# Patient Record
Sex: FEMALE | Race: WHITE | NOT HISPANIC OR LATINO | Employment: OTHER | URBAN - NONMETROPOLITAN AREA
[De-identification: names, ages, dates, MRNs, and addresses within clinical notes are randomized per-mention and may not be internally consistent; named-entity substitution may affect disease eponyms.]

---

## 2017-01-21 ENCOUNTER — OFFICE VISIT (OUTPATIENT)
Dept: URGENT CARE | Facility: PHYSICIAN GROUP | Age: 82
End: 2017-01-21
Payer: MEDICARE

## 2017-01-21 VITALS
OXYGEN SATURATION: 94 % | HEIGHT: 61 IN | BODY MASS INDEX: 30.78 KG/M2 | WEIGHT: 163 LBS | SYSTOLIC BLOOD PRESSURE: 114 MMHG | HEART RATE: 88 BPM | TEMPERATURE: 98.2 F | DIASTOLIC BLOOD PRESSURE: 82 MMHG | RESPIRATION RATE: 14 BRPM

## 2017-01-21 DIAGNOSIS — L60.0 INGROWN NAIL OF GREAT TOE OF LEFT FOOT: ICD-10-CM

## 2017-01-21 PROCEDURE — 99214 OFFICE O/P EST MOD 30 MIN: CPT | Performed by: PHYSICIAN ASSISTANT

## 2017-01-21 NOTE — PROGRESS NOTES
Chief Complaint   Patient presents with   • Ingrown Toenail     L/ foot       HISTORY OF PRESENT ILLNESS: Patient is a 81 y.o. female who presents today with her  for the following:    Ingrown left great toe nail x weeks  C/o pain but denies STS  Reports h/o the same  Denies drainage  Has not taken any OTC meds or soaked her foot    Patient Active Problem List    Diagnosis Date Noted   • Fracture of clavicle, closed 01/15/2010       Allergies:Cefaclor; Compazine; Morphine; Penicillins; and Sulfa drugs    Current Outpatient Prescriptions Ordered in [facility]   Medication Sig Dispense Refill   • oxycodone immediate release (ROXICODONE) 10 MG immediate release tablet Take 10 mg by mouth every four hours as needed for Moderate Pain.     • doxycycline (VIBRAMYCIN) 100 MG Tab Take 1 Tab by mouth 2 times a day. 20 Tab 0   • guaifenesin-codeine (CHERATUSSIN AC) Solution oral solution Take 5 mL by mouth every four hours as needed for Cough. 120 mL 0   • levothyroxine (SYNTHROID) 125 MCG Tab Take 125 mcg by mouth Every morning on an empty stomach.     • albuterol (PROVENTIL) 2.5mg/3ml Nebu Soln solution for nebulization 3 mL by Nebulization route every four hours as needed for Shortness of Breath. 150 mL 0   • LOSARTAN POTASSIUM PO Take  by mouth.     • furosemide (LASIX) 20 MG TABS Take 20 mg by mouth 2 times a day.     • Mag Aspart-Potassium Aspart (POTASSIUM & MAGNESIUM ASPARTAT PO) Take 200 mg by mouth.       No current Epic-ordered facility-administered medications on file.       Past Medical History   Diagnosis Date   • Anaphylactic shock    • Pneumonia        Social History   Substance Use Topics   • Smoking status: Never Smoker    • Smokeless tobacco: Not on file   • Alcohol Use: No       No family status information on file.   No family history on file.    ROS:   Review of Systems   Constitutional: Negative for fever, chills, weight loss and malaise/fatigue.   HENT: Negative for ear pain, nosebleeds, congestion,  "sore throat and neck pain.    Eyes: Negative for blurred vision.   Respiratory: Negative for cough, sputum production, shortness of breath and wheezing.    Cardiovascular: Negative for chest pain, palpitations, orthopnea and leg swelling.   Gastrointestinal: Negative for heartburn, nausea, vomiting and abdominal pain.   Genitourinary: Negative for dysuria, urgency and frequency.       Exam:  Blood pressure 114/82, pulse 88, temperature 36.8 °C (98.2 °F), resp. rate 14, height 1.549 m (5' 1\"), weight 73.936 kg (163 lb), SpO2 94 %.  General: Normal appearing. No distress.  HEENT: Head is grossly normal.  Pulmonary: No respiratory distress noted.  Cardiovascular: Left pedal pulse is strong.  Neurologic: Grossly nonfocal. No sensory deficit noted.  Extremities: Left great toe nail is ingrowing on the medial aspect only. No STS, erythema, or drainage noted at the site. TTP.  Skin: No obvious lesions.  Psych: Normal mood. Alert and oriented x3. Judgment and insight is normal.    Assessment/Plan:  Discussed soaking in warm epsom salt water. Will refer to podiatry for further evaluation and management.    1. Ingrown nail of great toe of left foot  REFERRAL TO PODIATRY         "

## 2017-03-16 ENCOUNTER — HOSPITAL ENCOUNTER (OUTPATIENT)
Dept: LAB | Facility: MEDICAL CENTER | Age: 82
End: 2017-03-16
Attending: NURSE PRACTITIONER
Payer: MEDICARE

## 2017-03-16 ENCOUNTER — OFFICE VISIT (OUTPATIENT)
Dept: URGENT CARE | Facility: PHYSICIAN GROUP | Age: 82
End: 2017-03-16
Payer: MEDICARE

## 2017-03-16 ENCOUNTER — HOSPITAL ENCOUNTER (OUTPATIENT)
Dept: RADIOLOGY | Facility: MEDICAL CENTER | Age: 82
End: 2017-03-16
Attending: PHYSICIAN ASSISTANT
Payer: MEDICARE

## 2017-03-16 VITALS
DIASTOLIC BLOOD PRESSURE: 76 MMHG | HEIGHT: 61 IN | BODY MASS INDEX: 30.78 KG/M2 | WEIGHT: 163 LBS | HEART RATE: 67 BPM | SYSTOLIC BLOOD PRESSURE: 122 MMHG | RESPIRATION RATE: 20 BRPM | TEMPERATURE: 98.6 F | OXYGEN SATURATION: 97 %

## 2017-03-16 DIAGNOSIS — J22 LOWER RESPIRATORY INFECTION (E.G., BRONCHITIS, PNEUMONIA, PNEUMONITIS, PULMONITIS): ICD-10-CM

## 2017-03-16 DIAGNOSIS — R05.9 COUGH: ICD-10-CM

## 2017-03-16 DIAGNOSIS — J22 LOWER RESPIRATORY INFECTION (E.G., BRONCHITIS, PNEUMONIA, PNEUMONITIS, PULMONITIS): Primary | ICD-10-CM

## 2017-03-16 LAB
ALBUMIN SERPL BCP-MCNC: 3.8 G/DL (ref 3.2–4.9)
ALBUMIN/GLOB SERPL: 1.1 G/DL
ALP SERPL-CCNC: 93 U/L (ref 30–99)
ALT SERPL-CCNC: <5 U/L (ref 2–50)
ANION GAP SERPL CALC-SCNC: 7 MMOL/L (ref 0–11.9)
AST SERPL-CCNC: 15 U/L (ref 12–45)
BILIRUB SERPL-MCNC: 0.3 MG/DL (ref 0.1–1.5)
BUN SERPL-MCNC: 15 MG/DL (ref 8–22)
CALCIUM SERPL-MCNC: 9 MG/DL (ref 8.5–10.5)
CHLORIDE SERPL-SCNC: 96 MMOL/L (ref 96–112)
CO2 SERPL-SCNC: 27 MMOL/L (ref 20–33)
CREAT SERPL-MCNC: 0.9 MG/DL (ref 0.5–1.4)
GLOBULIN SER CALC-MCNC: 3.4 G/DL (ref 1.9–3.5)
GLUCOSE SERPL-MCNC: 86 MG/DL (ref 65–99)
POTASSIUM SERPL-SCNC: 4.2 MMOL/L (ref 3.6–5.5)
PROT SERPL-MCNC: 7.2 G/DL (ref 6–8.2)
SODIUM SERPL-SCNC: 130 MMOL/L (ref 135–145)

## 2017-03-16 PROCEDURE — 4040F PNEUMOC VAC/ADMIN/RCVD: CPT | Mod: 8P | Performed by: PHYSICIAN ASSISTANT

## 2017-03-16 PROCEDURE — G8419 CALC BMI OUT NRM PARAM NOF/U: HCPCS | Performed by: PHYSICIAN ASSISTANT

## 2017-03-16 PROCEDURE — 1036F TOBACCO NON-USER: CPT | Performed by: PHYSICIAN ASSISTANT

## 2017-03-16 PROCEDURE — G8432 DEP SCR NOT DOC, RNG: HCPCS | Performed by: PHYSICIAN ASSISTANT

## 2017-03-16 PROCEDURE — 99214 OFFICE O/P EST MOD 30 MIN: CPT | Performed by: PHYSICIAN ASSISTANT

## 2017-03-16 PROCEDURE — G8484 FLU IMMUNIZE NO ADMIN: HCPCS | Performed by: PHYSICIAN ASSISTANT

## 2017-03-16 PROCEDURE — 71020 DX-CHEST-2 VIEWS: CPT

## 2017-03-16 PROCEDURE — 1101F PT FALLS ASSESS-DOCD LE1/YR: CPT | Mod: 8P | Performed by: PHYSICIAN ASSISTANT

## 2017-03-16 RX ORDER — AZITHROMYCIN 250 MG/1
TABLET, FILM COATED ORAL
Qty: 6 TAB | Refills: 0 | Status: SHIPPED | OUTPATIENT
Start: 2017-03-16 | End: 2017-03-27

## 2017-03-16 RX ORDER — METOPROLOL SUCCINATE 50 MG/1
25 TABLET, EXTENDED RELEASE ORAL 2 TIMES DAILY
COMMUNITY

## 2017-03-16 ASSESSMENT — ENCOUNTER SYMPTOMS
SHORTNESS OF BREATH: 1
GASTROINTESTINAL NEGATIVE: 1
COUGH: 1
CARDIOVASCULAR NEGATIVE: 1
SPUTUM PRODUCTION: 1
PSYCHIATRIC NEGATIVE: 1
MUSCULOSKELETAL NEGATIVE: 1
CHILLS: 1
EYES NEGATIVE: 1
DIZZINESS: 1
FEVER: 1

## 2017-03-16 NOTE — PROGRESS NOTES
Subjective:      Samanta Lozano is a 81 y.o. female who presents with Cough            Cough  Associated symptoms include chills, a fever and shortness of breath.     Chief Complaint   Patient presents with   • Cough     productive x6days       HPI:  Samanta Lozano is a 81 y.o. female who presents with productive cough x 6 days.  Had double PNE in November and was hospitalized.  Got home in January she finally got home.  Cough and fatigue.  Green to yellow mucous.  No runny nose or sore throat.  Low grade fever at home.      Hasn't tried any otc medication.  Patient denies HA, SOB, chest pain, palpitations, fever, chills, or n/v/d.      Past Medical History   Diagnosis Date   • Anaphylactic shock    • Pneumonia        Past Surgical History   Procedure Laterality Date   • Other orthopedic surgery       bilat knee, C5-6 fusion, multiple ortho surgeries   • Gyn surgery         No family history on file.    Social History     Social History   • Marital Status:      Spouse Name: N/A   • Number of Children: N/A   • Years of Education: N/A     Occupational History   • Not on file.     Social History Main Topics   • Smoking status: Never Smoker    • Smokeless tobacco: Not on file   • Alcohol Use: No   • Drug Use: No   • Sexual Activity: Not on file     Other Topics Concern   • Not on file     Social History Narrative         Current outpatient prescriptions:   •  metoprolol SR, 50 mg, Oral, DAILY  •  oxycodone immediate release, 10 mg, Oral, Q4HRS PRN, 3/16/2017  •  levothyroxine, 125 mcg, Oral, AM ES, 3/16/2017  •  LOSARTAN POTASSIUM PO, Take  by mouth., 3/16/2017  •  furosemide, 20 mg, Oral, BID, 3/16/2017  •  Mag Aspart-Potassium Aspart (POTASSIUM & MAGNESIUM ASPARTAT PO), Take 200 mg by mouth., 3/16/2017  •  doxycycline, 100 mg, Oral, BID  •  guaifenesin-codeine, 5 mL, Oral, Q4HRS PRN  •  albuterol, 2.5 mg, Nebulization, Q4HRS PRN, prn    Allergies   Allergen Reactions   • Cefaclor    • Compazine    •  "Morphine    • Penicillins    • Sulfa Drugs             Review of Systems   Constitutional: Positive for fever, chills and malaise/fatigue.   HENT: Negative.    Eyes: Negative.    Respiratory: Positive for cough, sputum production and shortness of breath.    Cardiovascular: Negative.    Gastrointestinal: Negative.    Genitourinary: Negative.    Musculoskeletal: Negative.    Skin: Negative.    Neurological: Positive for dizziness.   Endo/Heme/Allergies: Negative.    Psychiatric/Behavioral: Negative.           Objective:     /76 mmHg  Pulse 67  Temp(Src) 37 °C (98.6 °F)  Resp 20  Ht 1.549 m (5' 0.98\")  Wt 73.936 kg (163 lb)  BMI 30.81 kg/m2  SpO2 97%     Physical Exam       Nursing note and vitals reviewed.    Constitutional:   Appropriately groomed, pleasant affect, well nourished, in NAD.    Head:   Normocephalic, atraumatic.    Eyes:   PERRLA, EOM's full, sclera white, conjunctiva not erythematous, and medial canthus without exudate bilaterally.    Ears:  Auricle and tragus non-tender to manipulation.  No pre-auricular lymphadenopathy or mastoid ttp.  EACs with mild cerumen bilaterally, not erythematous.  TM’s pearly gray with cone of light present and umbo and malleolus visible bilaterally.  No bulging or fluid bubbles present in middle ear.  Hearing grossly intact to voice.    Nose:  Nares not patent bilaterally.  Nasal mucosa edematous with white rhinorrhea bilaterally.  Mild sinus tenderness to percussion.    Throat:  Dentition wnl, mucosa moist without lesions.  Oropharynx mildly erythematous, with no enlargement of the palatine tonsils bilaterally with no exudates.    Post nasal drainage  present.  Soft palate rises symmetrically bilaterally and uvula midline.      Neck: Neck supple, with mild anterior lymphadenopathy that is soft and mobile to palpation. Thyroid non-palpable without tenderness or nodules. No supraclavicular lymphadenopathy.    Lungs:  Respiratory effort not labored without " accessory muscle use.  Lungs with inspiratory wheezes to auscultation and bibasilar rales. Rhonchi cleared with cough.    Heart:  RRR, without murmurs rubs or gallops.  Radial and dorsalis pedis pulse 2+ bilaterally.  No LE edema.    Musculoskeletal:  Gait non-antalgic with a narrow base.    Derm:  Skin without rashes or lesions with good turgor pressure.      Psychiatric:  Mood, affect, and judgement appropriate.  3/16/2017 3:13 PM    HISTORY/REASON FOR EXAM:  Cough and history of pneumonia one month ago.      TECHNIQUE/EXAM DESCRIPTION AND NUMBER OF VIEWS:  Two views of the chest.    COMPARISON:  06/02/2016    FINDINGS:  The heart is normal in size.  No pulmonary infiltrates or consolidations are noted.  No pleural effusions are appreciated.  Minimal interstitial opacifications are again noted mainly in the left lung base.         Impression        No active disease.         Reading Provider Reading Date     Abbe Archer M.D. Mar 16, 2017          Assessment/Plan:     1. Lower respiratory infection (e.g., bronchitis, pneumonia, pneumonitis, pulmonitis)  Patient presents with 6 days of productive cough with yellow productive phlegm. Available for review during appointment. Patient has been having increased fatigue and symptoms similar to previous ammonia episode in which she was hospitalized for some time. On exam patient is afebrile with oxygen saturation 97% room air. Not acute appearing. No significant coryza or postnasal drip present. Lungs with his inspiratory wheezes to auscultation and bibasilar rails. X-ray reviewed by me shows evidence of basilar interstitial opacification with no active consolidation or evidence of pneumonia. Given patient's symptomology and age plan of treatment with azithromycin and advised following up with urgent care in 3-4 days or with primary care for improving.    Patient was in agreement with this treatment plan and seemed to understand without barriers. All questions were  encouraged and answered.  Reviewed signs and symptoms of when to seek emergency medical care.     Please note that this dictation was created using voice recognition software.  I have made every reasonable attempt to correct obvious errors, but I expect there are errors of petra and possibly content that I did not discover before finalizing the note.     - DX-CHEST-2 VIEWS; Future  - azithromycin (ZITHROMAX) 250 MG Tab; 2 tablets on day one, then 1 tablet PO daily until done.  Dispense: 6 Tab; Refill: 0    2. Cough    - DX-CHEST-2 VIEWS; Future  - azithromycin (ZITHROMAX) 250 MG Tab; 2 tablets on day one, then 1 tablet PO daily until done.  Dispense: 6 Tab; Refill: 0

## 2017-03-16 NOTE — PATIENT INSTRUCTIONS
Get the X-ray done.  Do breathing tx every 4-6 hours for lung tightness and shortness of breath.  Start antibiotic.  Stay well hydrated.  Return next week to see PCP.    Return Saturday or Sunday for recheck at urgent care.

## 2017-03-17 ENCOUNTER — TELEPHONE (OUTPATIENT)
Dept: URGENT CARE | Facility: PHYSICIAN GROUP | Age: 82
End: 2017-03-17

## 2017-03-17 NOTE — TELEPHONE ENCOUNTER
Spoke to patient regarding negative x-ray.  Taking the antibiotic.  Advised f/u with UC next week or PCP.  All questions encouraged and answered.

## 2017-03-21 ENCOUNTER — OFFICE VISIT (OUTPATIENT)
Dept: URGENT CARE | Facility: PHYSICIAN GROUP | Age: 82
End: 2017-03-21
Payer: MEDICARE

## 2017-03-21 VITALS
RESPIRATION RATE: 20 BRPM | HEART RATE: 87 BPM | BODY MASS INDEX: 30.25 KG/M2 | TEMPERATURE: 98.4 F | OXYGEN SATURATION: 94 % | DIASTOLIC BLOOD PRESSURE: 74 MMHG | SYSTOLIC BLOOD PRESSURE: 132 MMHG | WEIGHT: 160 LBS

## 2017-03-21 DIAGNOSIS — J22 ACUTE LOWER RESPIRATORY INFECTION: ICD-10-CM

## 2017-03-21 PROCEDURE — G8484 FLU IMMUNIZE NO ADMIN: HCPCS | Performed by: NURSE PRACTITIONER

## 2017-03-21 PROCEDURE — 1101F PT FALLS ASSESS-DOCD LE1/YR: CPT | Mod: 8P | Performed by: NURSE PRACTITIONER

## 2017-03-21 PROCEDURE — G8419 CALC BMI OUT NRM PARAM NOF/U: HCPCS | Performed by: NURSE PRACTITIONER

## 2017-03-21 PROCEDURE — G8432 DEP SCR NOT DOC, RNG: HCPCS | Performed by: NURSE PRACTITIONER

## 2017-03-21 PROCEDURE — 99214 OFFICE O/P EST MOD 30 MIN: CPT | Performed by: NURSE PRACTITIONER

## 2017-03-21 PROCEDURE — 1036F TOBACCO NON-USER: CPT | Performed by: NURSE PRACTITIONER

## 2017-03-21 PROCEDURE — 4040F PNEUMOC VAC/ADMIN/RCVD: CPT | Mod: 8P | Performed by: NURSE PRACTITIONER

## 2017-03-21 RX ORDER — DOXYCYCLINE HYCLATE 100 MG/1
100 CAPSULE ORAL 2 TIMES DAILY
Qty: 20 CAP | Refills: 0 | Status: SHIPPED | OUTPATIENT
Start: 2017-03-21 | End: 2017-04-10

## 2017-03-21 ASSESSMENT — ENCOUNTER SYMPTOMS
SPUTUM PRODUCTION: 1
COUGH: 1
FEVER: 0
SORE THROAT: 0
WEAKNESS: 1
DIARRHEA: 0
SHORTNESS OF BREATH: 1
RHINORRHEA: 0
CHILLS: 0
VOMITING: 0
NAUSEA: 0
MYALGIAS: 0

## 2017-03-21 NOTE — PROGRESS NOTES
Subjective:      Samanta Lozano is a 81 y.o. female who presents with Pneumonia            HPI Comments: Medications, Allergies and Prior Medical Hx reviewed and updated in [facility].with patient/family today     Patient has a history of hospitalization due to pneumonia and prolonged rehabilitation. She developed a lower respiratory symptoms again was seen here, placed on a Z-Gamaliel. Patient states she is still coughing up mucus. Patient has been unable get into her primary care provider for follow-up. Since her hospitalization. She is complaining of being fatigued, weak.    Cough  This is a new problem. The current episode started in the past 7 days. The problem has been gradually improving. The cough is productive of sputum. Associated symptoms include shortness of breath. Pertinent negatives include no chills, ear pain, fever, myalgias, nasal congestion, rhinorrhea or sore throat. She has tried a beta-agonist inhaler (z pack) for the symptoms. The treatment provided mild relief. Her past medical history is significant for pneumonia.       Review of Systems   Constitutional: Positive for malaise/fatigue. Negative for fever and chills.   HENT: Negative for congestion, ear discharge, ear pain, rhinorrhea and sore throat.    Respiratory: Positive for cough, sputum production and shortness of breath.    Gastrointestinal: Negative for nausea, vomiting and diarrhea.   Musculoskeletal: Negative for myalgias.   Neurological: Positive for weakness.          Objective:     /74 mmHg  Pulse 87  Temp(Src) 36.9 °C (98.4 °F)  Resp 20  Wt 72.576 kg (160 lb)  SpO2 94%     Physical Exam   Constitutional: She appears well-developed and well-nourished. No distress.   HENT:   Head: Normocephalic and atraumatic.   Right Ear: Tympanic membrane and ear canal normal.   Left Ear: Tympanic membrane and ear canal normal.   Nose: No rhinorrhea.   Mouth/Throat: Uvula is midline and mucous membranes are normal. Posterior oropharyngeal  erythema present. No oropharyngeal exudate or posterior oropharyngeal edema.   Eyes: Conjunctivae are normal. Pupils are equal, round, and reactive to light.   Neck: Neck supple.   Cardiovascular: Normal rate, regular rhythm and normal heart sounds.    Pulmonary/Chest: Effort normal and breath sounds normal. No respiratory distress. She has no wheezes. She has no rales.   Lymphadenopathy:     She has cervical adenopathy.   Neurological: She is alert.   Skin: Skin is warm and dry.   Psychiatric: She has a normal mood and affect. Her behavior is normal.   Vitals reviewed.              Assessment/Plan:       1. Acute lower respiratory infection  doxycycline (VIBRAMYCIN) 100 MG Cap       Patient states she dropped and lost one of her azithromycin, so she was only able to complete 4 days of her treatment.    Appointment was set up for the patient with her primary care physician for next week.    Pt will go to the ER for worsening or changing symptoms as discussed,   Follow-up with your primary care provider as scheduled  Discharge instructions discussed with pt/family who verbalize understanding and agreement with poc

## 2017-03-21 NOTE — MR AVS SNAPSHOT
Samanta Lozano   3/21/2017 3:25 PM   Office Visit   MRN: [id number]    Department:  [facility]   Dept Phone:  [phone]    Description:  Female : 1935   Provider:  CHINO Mcnamara           Reason for Visit     Pneumonia           Allergies as of 3/21/2017     Allergen Noted Reactions    Cefaclor 2008       Compazine 2008       Morphine 2008       Penicillins 2008       Sulfa Drugs 2008         You were diagnosed with     Acute lower respiratory infection   [[id number]]         Vital Signs     Blood Pressure Pulse Temperature Respirations Weight Oxygen Saturation    132/74 mmHg 87 36.9 °C (98.4 °F) 20 72.576 kg (160 lb) 94%    Smoking Status                   Never Smoker            Basic Information     Date Of Birth Sex Race Ethnicity Preferred Language    1935 Female White Non- English      Your appointments     Mar 27, 2017  9:20 AM   Established Patient with Deepa Powell M.D.   [facility] ([address] [address] [phone]           You will be receiving a confirmation call a few days before your appointment from our automated call confirmation system.              Problem List              ICD-10-CM Priority Class Noted - Resolved    Fracture of clavicle, closed S42.009A   1/15/2010 - Present      Health Maintenance        Date Due Completion Dates    PAP SMEAR 10/19/1956 ---    MAMMOGRAM 10/19/1975 ---    COLONOSCOPY 10/19/1985 ---    IMM ZOSTER VACCINE 10/19/1995 ---    BONE DENSITY 10/19/2000 ---    IMM PNEUMOCOCCAL 65+ (ADULT) LOW/MEDIUM RISK SERIES (1 of 2 - PCV13) 10/19/2000 ---    IMM INFLUENZA (1) 2016 ---    IMM DTaP/Tdap/Td Vaccine (2 - Td) 2020            Current Immunizations     Tdap Vaccine 2010  4:47 PM      Below and/or attached are the medications your provider expects you to take. Review all of your home medications and newly ordered  medications with your provider and/or pharmacist. Follow medication instructions as directed by your provider and/or pharmacist. Please keep your medication list with you and share with your provider. Update the information when medications are discontinued, doses are changed, or new medications (including over-the-counter products) are added; and carry medication information at all times in the event of emergency situations     Allergies:  CEFACLOR - (reactions not documented)     COMPAZINE - (reactions not documented)     MORPHINE - (reactions not documented)     PENICILLINS - (reactions not documented)     SULFA DRUGS - (reactions not documented)               Medications  Valid as of: March 21, 2017 -  4:22 PM    Generic Name Brand Name Tablet Size Instructions for use    Albuterol Sulfate (Nebu Soln) PROVENTIL 2.5mg/3ml 3 mL by Nebulization route every four hours as needed for Shortness of Breath.        Azithromycin (Tab) ZITHROMAX 250 MG 2 tablets on day one, then 1 tablet PO daily until done.        Doxycycline Hyclate (Tab) VIBRAMYCIN 100 MG Take 1 Tab by mouth 2 times a day.        Doxycycline Hyclate (Cap) VIBRAMYCIN 100 MG Take 1 Cap by mouth 2 times a day.        Furosemide (Tab) LASIX 20 MG Take 20 mg by mouth 2 times a day.        Guaifenesin-Codeine (Solution) ROBITUSSIN -10 mg/5mL Take 5 mL by mouth every four hours as needed for Cough.        Levothyroxine Sodium (Tab) SYNTHROID 125 MCG Take 125 mcg by mouth Every morning on an empty stomach.        Losartan Potassium   Take  by mouth.        Mag Aspart-Potassium Aspart   Take 200 mg by mouth.        Metoprolol Succinate (TABLET SR 24 HR) TOPROL XL 50 MG Take 50 mg by mouth every day.        OxyCODONE HCl (Tab) ROXICODONE 10 MG Take 10 mg by mouth every four hours as needed for Moderate Pain.        .                 Medicines prescribed today were sent to:     [id number] - [place], [place] - [address] [facility]  [address]    [address] [place] [address]    Phone: [phone] Fax: [phone]    Open 24 Hours?: No      Medication refill instructions:       If your prescription bottle indicates you have medication refills left, it is not necessary to call your provider’s office. Please contact your pharmacy and they will refill your medication.    If your prescription bottle indicates you do not have any refills left, you may request refills at any time through one of the following ways: The online [employer] system (except Urgent Care), by calling your provider’s office, or by asking your pharmacy to contact your provider’s office with a refill request. Medication refills are processed only during regular business hours and may not be available until the next business day. Your provider may request additional information or to have a follow-up visit with you prior to refilling your medication.   *Please Note: Medication refills are assigned a new Rx number when refilled electronically. Your pharmacy may indicate that no refills were authorized even though a new prescription for the same medication is available at the pharmacy. Please request the medicine by name with the pharmacy before contacting your provider for a refill.           [employer] Access Code: [email or web address]  Expires: 4/9/2017  2:25 PM    Your email address is not on file at [facility].  Email Addresses are required for you to sign up for [employer], please contact [phone] to verify your personal information and to provide your email address prior to attempting to register for [employer].    Samanta Lozano  [address] [place] [address]    [employer]  A secure, online tool to manage your health information     [facility]’s [employer] Software® is a secure, online tool that connects you to your personalized health information from the privacy of your home -- day or night - making it very easy for you to manage your healthcare. Once the activation  process is completed, you can even access your medical information using the [employer]ex dona, which is available for free in the Apple Dona store or Google Play store.     To learn more about [employer], visit [email or web address]    There are two levels of access available (as shown below):   My Chart Features  [facility] Doctor [facility]  Specialists [facility]  Urgent  Care Non-[facility] Primary Care Doctor   Email your healthcare team securely and privately 24/7 X X X    Manage appointments: schedule your next appointment; view details of past/upcoming appointments X      Request prescription refills. X      View recent personal medical records, including lab and immunizations X X X X   View health record, including health history, allergies, medications X X X X   Read reports about your outpatient visits, procedures, consult and ER notes X X X X   See your discharge summary, which is a recap of your hospital and/or ER visit that includes your diagnosis, lab results, and care plan X X  X     How to register for X[employer]ex:  Once your e-mail address has been verified, follow the following steps to sign up for [employer].     1. Go to  [email or web address]  2. Click on the Sign Up Now box, which takes you to the New Member Sign Up page. You will need to provide the following information:  a. Enter your [employer] Access Code exactly as it appears at the top of this page. (You will not need to use this code after you’ve completed the sign-up process. If you do not sign up before the expiration date, you must request a new code.)   b. Enter your date of birth.   c. Enter your home email address.   d. Click Submit, and follow the next screen’s instructions.  3. Create a TeamVisibilityt ID. This will be your Xcedex login ID and cannot be changed, so think of one that is secure and easy to remember.  4. Create a [employer] password. You can change your password at any time.  5. Enter your Password Reset Question and Answer. This can be  used at a later time if you forget your password.   6. Enter your e-mail address. This allows you to receive e-mail notifications when new information is available in [employer].  7. Click Sign Up. You can now view your health information.    For assistance activating your [employer] account, call [phone]

## 2017-03-24 ENCOUNTER — TELEPHONE (OUTPATIENT)
Dept: MEDICAL GROUP | Facility: PHYSICIAN GROUP | Age: 82
End: 2017-03-24

## 2017-03-27 ENCOUNTER — OFFICE VISIT (OUTPATIENT)
Dept: MEDICAL GROUP | Facility: PHYSICIAN GROUP | Age: 82
End: 2017-03-27
Payer: MEDICARE

## 2017-03-27 ENCOUNTER — APPOINTMENT (OUTPATIENT)
Dept: RADIOLOGY | Facility: IMAGING CENTER | Age: 82
End: 2017-03-27
Attending: FAMILY MEDICINE
Payer: MEDICARE

## 2017-03-27 DIAGNOSIS — J18.9 PNEUMONIA DUE TO INFECTIOUS ORGANISM, UNSPECIFIED LATERALITY, UNSPECIFIED PART OF LUNG: ICD-10-CM

## 2017-03-27 DIAGNOSIS — Z09 HOSPITAL DISCHARGE FOLLOW-UP: Primary | ICD-10-CM

## 2017-03-27 DIAGNOSIS — E03.9 HYPOTHYROIDISM, UNSPECIFIED TYPE: ICD-10-CM

## 2017-03-27 PROCEDURE — 1036F TOBACCO NON-USER: CPT | Performed by: FAMILY MEDICINE

## 2017-03-27 PROCEDURE — G8419 CALC BMI OUT NRM PARAM NOF/U: HCPCS | Performed by: FAMILY MEDICINE

## 2017-03-27 PROCEDURE — G8484 FLU IMMUNIZE NO ADMIN: HCPCS | Performed by: FAMILY MEDICINE

## 2017-03-27 PROCEDURE — 4040F PNEUMOC VAC/ADMIN/RCVD: CPT | Mod: 8P | Performed by: FAMILY MEDICINE

## 2017-03-27 PROCEDURE — 1100F PTFALLS ASSESS-DOCD GE2>/YR: CPT | Performed by: FAMILY MEDICINE

## 2017-03-27 PROCEDURE — 3288F FALL RISK ASSESSMENT DOCD: CPT | Mod: 8P | Performed by: FAMILY MEDICINE

## 2017-03-27 PROCEDURE — G8432 DEP SCR NOT DOC, RNG: HCPCS | Performed by: FAMILY MEDICINE

## 2017-03-27 PROCEDURE — 99215 OFFICE O/P EST HI 40 MIN: CPT | Performed by: FAMILY MEDICINE

## 2017-03-27 PROCEDURE — 0518F FALL PLAN OF CARE DOCD: CPT | Mod: 8P | Performed by: FAMILY MEDICINE

## 2017-03-27 PROCEDURE — 71020 DX-CHEST-2 VIEWS: CPT | Mod: 26 | Performed by: FAMILY MEDICINE

## 2017-03-28 ENCOUNTER — HOSPITAL ENCOUNTER (OUTPATIENT)
Dept: LAB | Facility: MEDICAL CENTER | Age: 82
End: 2017-03-28
Attending: FAMILY MEDICINE
Payer: MEDICARE

## 2017-03-28 DIAGNOSIS — E03.9 HYPOTHYROIDISM, UNSPECIFIED TYPE: ICD-10-CM

## 2017-03-28 LAB — TSH SERPL DL<=0.005 MIU/L-ACNC: 1.74 UIU/ML (ref 0.3–3.7)

## 2017-03-28 PROCEDURE — 36415 COLL VENOUS BLD VENIPUNCTURE: CPT

## 2017-03-28 PROCEDURE — 84443 ASSAY THYROID STIM HORMONE: CPT

## 2017-03-29 ENCOUNTER — TELEPHONE (OUTPATIENT)
Dept: MEDICAL GROUP | Age: 82
End: 2017-03-29

## 2017-03-29 VITALS
OXYGEN SATURATION: 96 % | HEIGHT: 61 IN | HEART RATE: 72 BPM | BODY MASS INDEX: 30.21 KG/M2 | TEMPERATURE: 98.1 F | DIASTOLIC BLOOD PRESSURE: 80 MMHG | WEIGHT: 160 LBS | SYSTOLIC BLOOD PRESSURE: 124 MMHG

## 2017-03-29 NOTE — PROGRESS NOTES
Subjective:   CC: hospital discharge follow up    HPI:     Samanta Lozano is a 81 y.o. female, new patient of the clinic, presents with the following concerns:     This is an 82 yo female, who was admitted to [facility] on 2016 for RUL/RML pneumonia and severe sepsis.   She received IVF and antibiotics. Sputum culture, blood culture, influenza A/B were negative. She was found to have elevated troponin w/o chest pain at time of admission. Lexiscan done on 2016 was negative for e/o ischemia. Her trop was trended and eventually returned to normal range. This was determined to be demand ischemia. Pt also has hx of Afib. She did goes into RVR during hospital admission, which responded well to a combination of metoprolol, digoxin, and Cardizem. She also developed DEBRA and electrolyte abnormalities. Both resolved at the time of discharge. Pt was discharged to SNF on 2016. She remained in rehab for couple weeks and was discharged home after.     Pt presented to urgent care on 3/16/2016 for productive cough and fatigue. CXR negative for active disease. Azithromycin was prescribed, but symptoms fail to improve. Pt again presented to urgent care on 3/21/2017. This time, Doxycyline was prescribed. Today, pt states that she is on the last dose of Doxycycline. Cough is improving, but she still feels very tired, and not quite back to baseline. She is concerned because her brother  from pneumonia couple months ago. She otherwise eats and sleeps well. She has good support from her  and family.     Since discharge, she was seen by her cardiologist.     She has hx of hypothyroidism. She used to take Levothyroxine, but was discontinued by previous PCP for unclear reason(s). She is interested in having her thyroid level rechecked.     Current medicines (including changes today)  Current Outpatient Prescriptions   Medication Sig Dispense Refill   • doxycycline (VIBRAMYCIN) 100 MG Cap Take 1 Cap by  "mouth 2 times a day. 20 Cap 0   • metoprolol SR (TOPROL XL) 50 MG TABLET SR 24 HR Take 25 mg by mouth 2 Times a Day.     • oxycodone immediate release (ROXICODONE) 10 MG immediate release tablet Take 10 mg by mouth every four hours as needed for Moderate Pain.     • doxycycline (VIBRAMYCIN) 100 MG Tab Take 1 Tab by mouth 2 times a day. 20 Tab 0   • guaifenesin-codeine (CHERATUSSIN AC) Solution oral solution Take 5 mL by mouth every four hours as needed for Cough. 120 mL 0   • albuterol (PROVENTIL) 2.5mg/3ml Nebu Soln solution for nebulization 3 mL by Nebulization route every four hours as needed for Shortness of Breath. 150 mL 0   • LOSARTAN POTASSIUM PO Take 25 mg by mouth 2 Times a Day.     • furosemide (LASIX) 20 MG TABS Take 40 mg by mouth every day.     • Mag Aspart-Potassium Aspart (POTASSIUM & MAGNESIUM ASPARTAT PO) Take 200 mg by mouth.     • levothyroxine (SYNTHROID) 125 MCG Tab Take 125 mcg by mouth Every morning on an empty stomach.       No current facility-administered medications for this visit.     She  has a past medical history of Anaphylactic shock and Pneumonia.    I personally reviewed patient's problem list, allergies, medications, family hx, social hx with patient and update EPIC.     REVIEW OF SYSTEMS:  CONSTITUTIONAL:  Denies night sweats, fatigue, malaise, lethargy, fever or chills.  RESPIRATORY:  Denies cough, wheeze, hemoptysis, or shortness of breath.  CARDIOVASCULAR:  Denies chest pains, palpitations, pedal edema  GASTROINTESTINAL:  Denies abdominal pain, nausea or vomiting, diarrhea, constipation, hematemesis, hematochezia, melena.  GENITOURINARY:  Denies urinary urgency, frequency, dysuria, or hematuria.  No obstructive symptoms.  Denies unusual discharge.       Objective:     Filed Vitals:    03/27/2017   BP: 124/80   Pulse: 72   Temp: 36.7 °C (98.1 °F)   Height: 1.549 m (5' 0.98\")   Weight: 72.576 kg (160 lb)   SpO2: 96%     Physical Exam:  Constitutional: awake, alert, in no " distress.  Skin: Warm, dry, good turgor, no rashes, bruises, ulcers in visible areas.  Eye: PERRL, conjunctiva clear, lids neg for edema or lesions.  Neck: Trachea midline, no masses, no thyromegaly. No cervical or supraclavicular lymphadenopathy  Respiratory: Unlabored respiratory effort, fine crackles heard at the left lower lung base, no wheezes, no rhonchi.  Cardiovascular: irregularly irregular, no murmur, no pedal edema.  Abdomen: Soft, non-tender to palpation, no hernia, no hepatosplenomegaly.  Neuro: CN2-12 grossly intact.     Psych: Oriented x3, affect and mood wnl, intact judgement and insight.       Assessment and Plan:   The following treatment plan was discussed    Hospital discharge follow-up  82 yo female with hx of recent hospital admission on 12/13/2016 for pneumonia, severe sepsis, a-fib with RVR, and demand ischemia. She responded well to treatment and was discharged to rehab 12/202016. She remains in rehab for 2-3 weeks and was discharge home after. Since discharge, pt was seen by her cardiologist for A-fib. She is currently taking Metoprolol SR 50 mg BID. It is noted that she is not on anticoagulation or aspirin. She has hx of hypothyroidism. She used to take 125 mcg for Levothyroxine, but it was discontinued by previous PCP. She endorses fatigue and wants to have her thyroid function evaluated. She recently visited urgent care twice for productive cough and fatigue. She was on a 5-day course of Azithromycin and she is finnishing up another course of Doxycycline. She continues to endorse fatigue and frustrated that it takes longer than expected for her to be back to baseline. She is afebrile, hemodynamically stable, sat well on room air. Lung exam noted for fine crackles at the left lower lung base, but otherwise unremarkable. In-office CXR was negative for active disease. Plan:   - TSH with reflex T4  - defer management of Afib to cardiology.   - fatigue is likely secondary to decondition from  recent major severe illnesses, possible also from untreated hypothyroidism. Stress and depression are possible. Pt is encouraged to make appointment to establish care with new PCP to discuss management of this problems. In the mean time, I encouraged rest, hydration, stress reduction, daily exercises as tolerated. I recommended home health, but pt declines.     Total 40 minutes face-to-face time spent with patient, with greater than 50% of the total time discussing patient's issues and symptoms as listed above in assessment and plan, as well as managing coordination of care for future evaluation and treatment.      Deepa Powell M.D.      Followup: need to make appointment with new PCP to establish care.     Please note that this dictation was created using voice recognition software. I have made every reasonable attempt to correct obvious errors, but I expect that there are errors of grammar and possibly content that I did not discover before finalizing the note.

## 2017-03-29 NOTE — TELEPHONE ENCOUNTER
----- Message from Deepa Powell M.D. sent at 3/29/2017 11:46 AM PDT -----  Please call pt & inform the following:   Your thyroid function is normal. Please schedule an appointment to establish care with your new PCP.   Thanks.   Deepa Powell M.D.

## 2017-04-10 ENCOUNTER — OFFICE VISIT (OUTPATIENT)
Dept: MEDICAL GROUP | Facility: PHYSICIAN GROUP | Age: 82
End: 2017-04-10
Payer: MEDICARE

## 2017-04-10 VITALS
RESPIRATION RATE: 18 BRPM | OXYGEN SATURATION: 97 % | WEIGHT: 170.6 LBS | HEIGHT: 62 IN | SYSTOLIC BLOOD PRESSURE: 126 MMHG | DIASTOLIC BLOOD PRESSURE: 78 MMHG | HEART RATE: 74 BPM | TEMPERATURE: 97.5 F | BODY MASS INDEX: 31.39 KG/M2

## 2017-04-10 DIAGNOSIS — Z79.891 CHRONIC USE OF OPIATE DRUGS THERAPEUTIC PURPOSES: Chronic | ICD-10-CM

## 2017-04-10 DIAGNOSIS — G25.81 RESTLESS LEG SYNDROME: ICD-10-CM

## 2017-04-10 DIAGNOSIS — I48.0 PAROXYSMAL ATRIAL FIBRILLATION (HCC): ICD-10-CM

## 2017-04-10 DIAGNOSIS — E66.9 OBESITY (BMI 30-39.9): ICD-10-CM

## 2017-04-10 DIAGNOSIS — I10 ESSENTIAL HYPERTENSION: ICD-10-CM

## 2017-04-10 DIAGNOSIS — Z00.00 MEDICARE ANNUAL WELLNESS VISIT, SUBSEQUENT: ICD-10-CM

## 2017-04-10 DIAGNOSIS — I50.32 CHRONIC DIASTOLIC CONGESTIVE HEART FAILURE (HCC): ICD-10-CM

## 2017-04-10 PROBLEM — I50.9 CHF (CONGESTIVE HEART FAILURE) (HCC): Status: ACTIVE | Noted: 2017-04-10

## 2017-04-10 PROBLEM — E03.9 HYPOTHYROIDISM: Status: RESOLVED | Noted: 2017-03-27 | Resolved: 2017-04-10

## 2017-04-10 PROBLEM — I48.20 CHRONIC ATRIAL FIBRILLATION (HCC): Status: ACTIVE | Noted: 2017-04-10

## 2017-04-10 PROCEDURE — G0438 PPPS, INITIAL VISIT: HCPCS | Performed by: FAMILY MEDICINE

## 2017-04-10 PROCEDURE — G8432 DEP SCR NOT DOC, RNG: HCPCS | Performed by: FAMILY MEDICINE

## 2017-04-10 PROCEDURE — 1036F TOBACCO NON-USER: CPT | Performed by: FAMILY MEDICINE

## 2017-04-10 RX ORDER — DIGOXIN 125 MCG
125 TABLET ORAL DAILY
COMMUNITY

## 2017-04-10 RX ORDER — ROPINIROLE 1 MG/1
1 TABLET, FILM COATED ORAL
Qty: 90 TAB | Refills: 1
Start: 2017-04-10

## 2017-04-10 RX ORDER — FUROSEMIDE 40 MG/1
40 TABLET ORAL DAILY
Qty: 90 TAB | Refills: 1
Start: 2017-04-10

## 2017-04-10 RX ORDER — ROPINIROLE 1 MG/1
1 TABLET, FILM COATED ORAL 3 TIMES DAILY
COMMUNITY
End: 2017-04-10

## 2017-04-10 RX ORDER — GABAPENTIN 600 MG/1
600 TABLET ORAL 3 TIMES DAILY
COMMUNITY

## 2017-04-10 RX ORDER — POTASSIUM CHLORIDE 1.5 G/1.58G
20 POWDER, FOR SOLUTION ORAL 2 TIMES DAILY
COMMUNITY
End: 2017-04-10

## 2017-04-10 RX ORDER — LOSARTAN POTASSIUM 50 MG/1
50 TABLET ORAL 2 TIMES DAILY
COMMUNITY

## 2017-04-10 ASSESSMENT — PATIENT HEALTH QUESTIONNAIRE - PHQ9
CLINICAL INTERPRETATION OF PHQ2 SCORE: 3
SUM OF ALL RESPONSES TO PHQ QUESTIONS 1-9: 7
5. POOR APPETITE OR OVEREATING: 1 - SEVERAL DAYS

## 2017-04-10 NOTE — PROGRESS NOTES
Chief Complaint   Patient presents with   • Annual Exam         HPI:  Samanta is a 81 y.o. here for Medicare Annual Wellness Visit     Patient Active Problem List    Diagnosis Date Noted   • Paroxysmal atrial fibrillation (CMS-HCC) 04/10/2017   • Restless leg syndrome 04/10/2017   • Essential hypertension 04/10/2017   • Chronic diastolic congestive heart failure (CMS-HCC) 04/10/2017   • Chronic use of opiate drugs therapeutic purposes 04/10/2017   • Fracture of clavicle, closed 01/15/2010       Current Outpatient Prescriptions   Medication Sig Dispense Refill   • ropinirole (REQUIP) 1 MG Tab Take 1 Tab by mouth every bedtime. 90 Tab 1   • digoxin (LANOXIN) 125 MCG Tab Take 125 mcg by mouth every day.     • rivaroxaban (XARELTO) 20 MG Tab tablet Take 1 Tab by mouth with dinner. 90 Tab 1   • furosemide (LASIX) 40 MG Tab Take 1 Tab by mouth every day. 90 Tab 1   • losartan (COZAAR) 50 MG Tab Take 50 mg by mouth 2 Times a Day.     • gabapentin (NEURONTIN) 600 MG tablet Take 600 mg by mouth 3 times a day.     • metoprolol SR (TOPROL XL) 50 MG TABLET SR 24 HR Take 25 mg by mouth 2 Times a Day.     • oxycodone immediate release (ROXICODONE) 10 MG immediate release tablet Take 10 mg by mouth every four hours as needed for Moderate Pain.     • albuterol (PROVENTIL) 2.5mg/3ml Nebu Soln solution for nebulization 3 mL by Nebulization route every four hours as needed for Shortness of Breath. 150 mL 0   • Mag Aspart-Potassium Aspart (POTASSIUM & MAGNESIUM ASPARTAT PO) Take 200 mg by mouth.       No current facility-administered medications for this visit.            Current supplements as per medication list.       Allergies: Cefaclor; Compazine; Morphine; Penicillins; and Sulfa drugs    Current social contact/activities: , lives with , adopted disable daughter and her son, goes out often, retired from a teaching job years ago.    She  reports that she has never smoked. She has never used smokeless tobacco. She  reports that she does not drink alcohol or use illicit drugs.  Counseling given: Not Answered        DPA/Advanced directive: Patient does not have an advanced directive. A packet and workshop information was given on advanced directives.     ROS:    Gait: Uses a walker   Ostomy: no   Other tubes: no   Amputations: no   Chronic oxygen use no   Last eye exam:    : Denies incontinence.       Screening:  Depression Screening    Little interest or pleasure in doing things?  1 - several days  Feeling down, depressed , or hopeless? 2 - more than half the days  Trouble falling or staying asleep, or sleeping too much?  1 - several days  Feeling tired or having little energy?  2 - more than half the days  Poor appetite or overeating?  1 - several days  Feeling bad about yourself - or that you are a failure or have let yourself or your family down? 0 - not at all  Trouble concentrating on things, such as reading the newspaper or watching television? 0 - not at all  Moving or speaking so slowly that other people could have noticed.  Or the opposite - being so fidgety or restless that you have been moving around a lot more than usual?  0 - not at all  Thoughts that you would be better off dead, or of hurting yourself?  0 - not at all  Patient Health Questionnaire Score: 7  If depressive symptoms identified deferred to follow up visit unless specifically addressed in assessment and plan.      Screening for Cognitive Impairment    Three Minute Recall (banana, sunrise, fence)  2/3    Draw clock face with all 12 numbers set to the hand to show 10 minures past 11 o'clock  1    No cognitive concerns identified      Fall Risk Assessment    Has the patient had two or more falls in the last year or any fall with injury in the last year?  No  Fall Risk identified    Safety Assessment    Throw rugs on floor.  No  Handrails on all stairs.  Yes  Good lighting in all hallways.  Yes  Difficulty hearing.  No  Patient counseled about all  "safety risks that were identified.    Functional Assessment ADLs    Are there any barriers preventing you from cooking for yourself or meeting nutritional needs?  Yes.    Are there any barriers preventing you from driving safely or obtaining transportation?  No.    Are there any barriers preventing you from using a telephone or calling for help?  Yes.    Are there any barriers preventing you from shopping?  Yes.    Are there any barriers preventing you from taking care of your own finances?  Yes.    Are there any barriers preventing you from managing your medications?  Yes.    Are currently engaing any exercise or physical activity?  No.       Health Maintenance Summary                Annual Wellness Visit Overdue 1935     PAP SMEAR Overdue 10/19/1956     MAMMOGRAM Overdue 10/19/1975     COLONOSCOPY Overdue 10/19/1985     IMM ZOSTER VACCINE Overdue 10/19/1995     BONE DENSITY Overdue 10/19/2000     IMM PNEUMOCOCCAL 65+ (ADULT) LOW/MEDIUM RISK SERIES Overdue 10/19/2000     IMM DTaP/Tdap/Td Vaccine Next Due 5/21/2020      Done 5/21/2010 Imm Admin: Tdap Vaccine          Patient Care Team:  Deepa Powell M.D. as PCP - General (Family Medicine)  Bossman Alcantara D.O. (Phys Med and Rehab)  Luisana Swan M.D. (Cardiology)      Social History   Substance Use Topics   • Smoking status: Never Smoker    • Smokeless tobacco: Never Used   • Alcohol Use: No     No family history on file.  She  has a past medical history of Anaphylactic shock and Pneumonia.   Past Surgical History   Procedure Laterality Date   • Other orthopedic surgery       bilat knee, C5-6 fusion, multiple ortho surgeries   • Gyn surgery         Exam:     Blood pressure 126/78, pulse 74, temperature 36.4 °C (97.5 °F), resp. rate 18, height 1.575 m (5' 2\"), weight 77.384 kg (170 lb 9.6 oz), SpO2 97 %. Body mass index is 31.2 kg/(m^2).    Hearing excellent.    Dentition good, wear dentures, no problems with chewing or swallowing  Alert, oriented in no acute " distress.  Eye contact is good, speech goal directed, affect calm      Assessment and Plan. The following treatment and monitoring plan is recommended:    1. Paroxysmal atrial fibrillation (CMS-HCC)  On digoxin, Metoprolol, and Xarelto  F/u with cardiology    2. Restless leg syndrome  On Requip, symptoms well controlled, will continue    3. Chronic use of opiate drugs therapeutic purposes  On Chronic Roxicodone for diffuse osteoarthritis, currently being managed by pain management, defer refills to pain management.    4. Essential hypertension  Chronic, on Losartan and Metoprolol, currently being managed by cardiology.    5. Chronic diastolic congestive heart failure (CMS-HCC)  On Lasix and K+ supplement, managed by cardiology         Services needed: as per orders if indicated.  Health Care Screening: Age-appropriate preventive services Medicare covers discussed today and ordered if indicated.    Referrals offered: Community-based lifestyle interventions to reduce health risks and promote self-management and wellness, fall prevention, nutrition, physical activity, tobacco-use cessation, weight loss, and mental health services as per orders if indicated.    Discussion today about general wellness and lifestyle habits:    · Prevent falls and reduce trip hazards; Cautioned about securing or removing rugs.  · Have a working fire alarm and carbon monoxide detector;   · Engage in regular physical activity and social activities       Follow-up: Return in about 6 months (around 10/10/2017) for Multiple issues.

## 2017-04-10 NOTE — MR AVS SNAPSHOT
"        Samanta Lozano   4/10/2017 10:00 AM   Office Visit   MRN: [id number]    Department:  [facility]   Dept Phone:  [phone]    Description:  Female : 1935   Provider:  Deepa Powell M.D.           Reason for Visit     Annual Exam           Allergies as of 4/10/2017     Allergen Noted Reactions    Cefaclor 2008       Compazine 2008       Morphine 2008       Penicillins 2008       Sulfa Drugs 2008         You were diagnosed with     Paroxysmal atrial fibrillation (CMS-[facility])   [867611]       Restless leg syndrome   [461116]       Chronic use of opiate drugs therapeutic purposes   [5169340]       Essential hypertension   [2017179]       Chronic diastolic congestive heart failure (CMS-HCC)   [075757]         Vital Signs     Blood Pressure Pulse Temperature Respirations Height Weight    126/78 mmHg 74 36.4 °C (97.5 °F) 18 1.575 m (5' 2\") 77.384 kg (170 lb 9.6 oz)    Body Mass Index Oxygen Saturation Smoking Status             31.20 kg/m2 97% Never Smoker          Basic Information     Date Of Birth Sex Race Ethnicity Preferred Language    1935 Female White Non- English      Problem List              ICD-10-CM Priority Class Noted - Resolved    Fracture of clavicle, closed S42.009A   1/15/2010 - Present    Paroxysmal atrial fibrillation (CMS-[facility]) I48.0   4/10/2017 - Present    Restless leg syndrome G25.81   4/10/2017 - Present    Essential hypertension I10   4/10/2017 - Present    Chronic diastolic congestive heart failure (CMS-[facility]) I50.32   4/10/2017 - Present    Chronic use of opiate drugs therapeutic purposes Z79.899   4/10/2017 - Present      Health Maintenance        Date Due Completion Dates    PAP SMEAR 10/19/1956 ---    MAMMOGRAM 10/19/1975 ---    COLONOSCOPY 10/19/1985 ---    IMM ZOSTER VACCINE 10/19/1995 ---    BONE DENSITY 10/19/2000 ---    IMM PNEUMOCOCCAL 65+ (ADULT) LOW/MEDIUM RISK SERIES (1 of 2 - PCV13) 10/19/2000 ---    IMM DTaP/Tdap/Td " Vaccine (2 - Td) 5/21/2020 5/21/2010            Current Immunizations     Tdap Vaccine 5/21/2010  4:47 PM      Below and/or attached are the medications your provider expects you to take. Review all of your home medications and newly ordered medications with your provider and/or pharmacist. Follow medication instructions as directed by your provider and/or pharmacist. Please keep your medication list with you and share with your provider. Update the information when medications are discontinued, doses are changed, or new medications (including over-the-counter products) are added; and carry medication information at all times in the event of emergency situations     Allergies:  CEFACLOR - (reactions not documented)     COMPAZINE - (reactions not documented)     MORPHINE - (reactions not documented)     PENICILLINS - (reactions not documented)     SULFA DRUGS - (reactions not documented)               Medications  Valid as of: April 10, 2017 - 10:52 AM    Generic Name Brand Name Tablet Size Instructions for use    Albuterol Sulfate (Nebu Soln) PROVENTIL 2.5mg/3ml 3 mL by Nebulization route every four hours as needed for Shortness of Breath.        Digoxin (Tab) LANOXIN 125 MCG Take 125 mcg by mouth every day.        Furosemide (Tab) LASIX 40 MG Take 1 Tab by mouth every day.        Gabapentin (Tab) NEURONTIN 600 MG Take 600 mg by mouth 3 times a day.        Losartan Potassium (Tab) COZAAR 50 MG Take 50 mg by mouth 2 Times a Day.        Mag Aspart-Potassium Aspart   Take 200 mg by mouth.        Metoprolol Succinate (TABLET SR 24 HR) TOPROL XL 50 MG Take 25 mg by mouth 2 Times a Day.        OxyCODONE HCl (Tab) ROXICODONE 10 MG Take 10 mg by mouth every four hours as needed for Moderate Pain.        Rivaroxaban (Tab) XARELTO 20 MG Take 1 Tab by mouth with dinner.        ROPINIRole HCl (Tab) REQUIP 1 MG Take 1 Tab by mouth every bedtime.        .                 Medicines prescribed today were sent to:     [facility]  ST[id number] - [place], [place] - [address] [facility] [address] [facility] [facility] [address]  [address]    [address] [facility] [address] [facility] [facility] [address] [place] [address]    Phone: [phone] Fax: [phone]    Open 24 Hours?: No      Medication refill instructions:       If your prescription bottle indicates you have medication refills left, it is not necessary to call your provider’s office. Please contact your pharmacy and they will refill your medication.    If your prescription bottle indicates you do not have any refills left, you may request refills at any time through one of the following ways: The online [employer] system (except Urgent Care), by calling your provider’s office, or by asking your pharmacy to contact your provider’s office with a refill request. Medication refills are processed only during regular business hours and may not be available until the next business day. Your provider may request additional information or to have a follow-up visit with you prior to refilling your medication.   *Please Note: Medication refills are assigned a new Rx number when refilled electronically. Your pharmacy may indicate that no refills were authorized even though a new prescription for the same medication is available at the pharmacy. Please request the medicine by name with the pharmacy before contacting your provider for a refill.           [employer] Access Code: [email or web address]  Expires: 5/8/2017 11:07 AM    Your email address is not on file at [facility].  Email Addresses are required for you to sign up for [employer], please contact [phone] to verify your personal information and to provide your email address prior to attempting to register for [employer].    Samanta Lozano  [address] [place] [address]    [employer]  A secure, online tool to manage your health information     [facility]’s [employer]® is a secure, online tool that connects you to your personalized health information from the privacy of your home -- day or night - making  it very easy for you to manage your healthcare. Once the activation process is completed, you can even access your medical information using the [employer] dona, which is available for free in the Apple Dona store or Google Play store.     To learn more about [employer], visit [email or web address]    There are two levels of access available (as shown below):   My Chart Features  [facility] Doctor [facility]  Specialists [facility]  Urgent  Care Non-[facility] Primary Care Doctor   Email your healthcare team securely and privately 24/7 X X X    Manage appointments: schedule your next appointment; view details of past/upcoming appointments X      Request prescription refills. X      View recent personal medical records, including lab and immunizations X X X X   View health record, including health history, allergies, medications X X X X   Read reports about your outpatient visits, procedures, consult and ER notes X X X X   See your discharge summary, which is a recap of your hospital and/or ER visit that includes your diagnosis, lab results, and care plan X X  X     How to register for [employer]:  Once your e-mail address has been verified, follow the following steps to sign up for [employer].     1. Go to  [email or web address]  2. Click on the Sign Up Now box, which takes you to the New Member Sign Up page. You will need to provide the following information:  a. Enter your [employer] Access Code exactly as it appears at the top of this page. (You will not need to use this code after you’ve completed the sign-up process. If you do not sign up before the expiration date, you must request a new code.)   b. Enter your date of birth.   c. Enter your home email address.   d. Click Submit, and follow the next screen’s instructions.  3. Create a [email or web address] ID. This will be your [employer] login ID and cannot be changed, so think of one that is secure and easy to remember.  4. Create a [employer] password. You can change your password at any  time.  5. Enter your Password Reset Question and Answer. This can be used at a later time if you forget your password.   6. Enter your e-mail address. This allows you to receive e-mail notifications when new information is available in [facility].  7. Click Sign Up. You can now view your health information.    For assistance activating your [facility] account, call [phone]

## 2017-04-12 NOTE — TELEPHONE ENCOUNTER
Phone Number Called: [phone] (home)     Message: patient informed of Dr. Powell's message below.    Left Message for patient to call back: no

## 2017-05-12 ENCOUNTER — OFFICE VISIT (OUTPATIENT)
Dept: URGENT CARE | Facility: PHYSICIAN GROUP | Age: 82
End: 2017-05-12
Payer: MEDICARE

## 2017-05-12 VITALS
DIASTOLIC BLOOD PRESSURE: 78 MMHG | WEIGHT: 170 LBS | SYSTOLIC BLOOD PRESSURE: 132 MMHG | BODY MASS INDEX: 31.28 KG/M2 | OXYGEN SATURATION: 98 % | TEMPERATURE: 98.4 F | RESPIRATION RATE: 20 BRPM | HEIGHT: 62 IN | HEART RATE: 78 BPM

## 2017-05-12 DIAGNOSIS — S96.911A: ICD-10-CM

## 2017-05-12 DIAGNOSIS — R21 RASH: ICD-10-CM

## 2017-05-12 PROCEDURE — 1101F PT FALLS ASSESS-DOCD LE1/YR: CPT | Performed by: PHYSICIAN ASSISTANT

## 2017-05-12 PROCEDURE — 99214 OFFICE O/P EST MOD 30 MIN: CPT | Performed by: PHYSICIAN ASSISTANT

## 2017-05-12 PROCEDURE — 1036F TOBACCO NON-USER: CPT | Performed by: PHYSICIAN ASSISTANT

## 2017-05-12 PROCEDURE — G8419 CALC BMI OUT NRM PARAM NOF/U: HCPCS | Performed by: PHYSICIAN ASSISTANT

## 2017-05-12 PROCEDURE — 4040F PNEUMOC VAC/ADMIN/RCVD: CPT | Mod: 8P | Performed by: PHYSICIAN ASSISTANT

## 2017-05-12 PROCEDURE — G8432 DEP SCR NOT DOC, RNG: HCPCS | Performed by: PHYSICIAN ASSISTANT

## 2017-05-12 RX ORDER — TRIAMCINOLONE ACETONIDE 1 MG/G
CREAM TOPICAL
Qty: 1 TUBE | Refills: 0 | Status: SHIPPED | OUTPATIENT
Start: 2017-05-12 | End: 2017-12-13

## 2017-05-12 RX ORDER — VALACYCLOVIR HYDROCHLORIDE 1 G/1
1000 TABLET, FILM COATED ORAL 3 TIMES DAILY
Qty: 21 TAB | Refills: 0 | Status: SHIPPED | OUTPATIENT
Start: 2017-05-12 | End: 2017-05-19

## 2017-05-12 ASSESSMENT — ENCOUNTER SYMPTOMS
VOMITING: 0
CHILLS: 0
NAUSEA: 0
SENSORY CHANGE: 0
FEVER: 0
FOCAL WEAKNESS: 0
BACK PAIN: 0
TINGLING: 0

## 2017-05-13 NOTE — PROGRESS NOTES
Subjective:      Samanta Lozano is a 81 y.o. female who presents with Herpes Zoster            Other  Associated symptoms include a rash. Pertinent negatives include no chills, fever, nausea or vomiting.   yesterday and today w/ rash to right low back, c/o burning itchy. PMH of varicella/zoster. Tried no meds thus far. Denies fever/chills. Denies cough/congestion/sorethroat/ear pain. PMH of zoster and notes this feels very similar, burning itchy pain.     Also notes today w/ sharp pain to left toe, denies PMH of ssimilar pain, denies PMH of gout. Denies injury or trauma.     Review of Systems   Constitutional: Negative for fever and chills.   Gastrointestinal: Negative for nausea and vomiting.   Musculoskeletal: Positive for joint pain ( POS for mild pain to right 2nd toe). Negative for back pain.   Skin: Positive for itching and rash.   Neurological: Negative for tingling, sensory change and focal weakness.       PMH:  has a past medical history of Anaphylactic shock and Pneumonia.  MEDS:   Current outpatient prescriptions:   •  ropinirole (REQUIP) 1 MG Tab, Take 1 Tab by mouth every bedtime., Disp: 90 Tab, Rfl: 1  •  digoxin (LANOXIN) 125 MCG Tab, Take 125 mcg by mouth every day., Disp: , Rfl:   •  rivaroxaban (XARELTO) 20 MG Tab tablet, Take 1 Tab by mouth with dinner., Disp: 90 Tab, Rfl: 1  •  furosemide (LASIX) 40 MG Tab, Take 1 Tab by mouth every day., Disp: 90 Tab, Rfl: 1  •  losartan (COZAAR) 50 MG Tab, Take 50 mg by mouth 2 Times a Day., Disp: , Rfl:   •  gabapentin (NEURONTIN) 600 MG tablet, Take 600 mg by mouth 3 times a day., Disp: , Rfl:   •  metoprolol SR (TOPROL XL) 50 MG TABLET SR 24 HR, Take 25 mg by mouth 2 Times a Day., Disp: , Rfl:   •  oxycodone immediate release (ROXICODONE) 10 MG immediate release tablet, Take 10 mg by mouth every four hours as needed for Moderate Pain., Disp: , Rfl:   •  albuterol (PROVENTIL) 2.5mg/3ml Nebu Soln solution for nebulization, 3 mL by Nebulization route every  "four hours as needed for Shortness of Breath., Disp: 150 mL, Rfl: 0  •  Mag Aspart-Potassium Aspart (POTASSIUM & MAGNESIUM ASPARTAT PO), Take 200 mg by mouth., Disp: , Rfl:   ALLERGIES:   Allergies   Allergen Reactions   • Cefaclor    • Compazine    • Morphine    • Penicillins    • Sulfa Drugs      SURGHX:   Past Surgical History   Procedure Laterality Date   • Other orthopedic surgery       bilat knee, C5-6 fusion, multiple ortho surgeries   • Gyn surgery     • Abdominal hysterectomy total     • Eye surgery Bilateral      SOCHX:  reports that she has never smoked. She has never used smokeless tobacco. She reports that she does not drink alcohol or use illicit drugs.  FH: Family history was reviewed, no pertinent findings to report    I have worn a mask for the entire encounter with this patient.      Objective:     /78 mmHg  Pulse 78  Temp(Src) 36.9 °C (98.4 °F)  Resp 20  Ht 1.575 m (5' 2\")  Wt 77.111 kg (170 lb)  BMI 31.09 kg/m2  SpO2 98%     Physical Exam   Constitutional: She is oriented to person, place, and time. She appears well-developed and well-nourished. No distress.   HENT:   Head: Normocephalic and atraumatic.   Right Ear: External ear normal.   Left Ear: External ear normal.   Nose: Nose normal.   Eyes: Conjunctivae and lids are normal. Right eye exhibits no discharge. Left eye exhibits no discharge. No scleral icterus.   Neck: Neck supple.   Pulmonary/Chest: Effort normal. No respiratory distress.   Musculoskeletal: Normal range of motion.        Feet:    2nd toe, grossly normal, no erythema/edema/ecchymosis, normal arom, no pain w/ PROM   Neurological: She is alert and oriented to person, place, and time. She is not disoriented.   Skin: Skin is warm, dry and intact. Rash noted. No purpura noted. Rash is not nodular, not vesicular and not urticarial. She is not diaphoretic. There is erythema ( very mild, non focal, diffuse). No pallor.        While rash is very mild erythematous and " nonspecific it is in an appropriate distribution for zoster, will cover w/ medication   Psychiatric: Her speech is normal and behavior is normal.   Nursing note and vitals reviewed.    Brannon taped toe to adjacent great toe, pt tolerates well, sent w/ post op shoe for support     If rash progresses:     Advised to avoid immunocompromised, children without hx of varicella and/or vaccine, and pregnant women. Advised not to touch rash.  May cover if she chooses.  Keep clean and dry.  Do not apply topical creams/ointments.     Assessment/Plan:   1. Rash  Will cover w/ antiviral despite no appearance of zoster like rash, will send w/ kenalog for itch if not zoster, ice and elevate foot, trend resolution of pain  Return to clinic with lack of resolution or progression of symptoms.    - valacyclovir (VALTREX) 1 GM Tab; Take 1 Tab by mouth 3 times a day for 7 days.  Dispense: 21 Tab; Refill: 0  - triamcinolone acetonide (KENALOG) 0.1 % Cream; Apply to affected area BID  Dispense: 1 Tube; Refill: 0    2. Strain of toe, right, initial encounter

## 2017-08-16 ENCOUNTER — OFFICE VISIT (OUTPATIENT)
Dept: URGENT CARE | Facility: PHYSICIAN GROUP | Age: 82
End: 2017-08-16
Payer: MEDICARE

## 2017-08-16 VITALS
TEMPERATURE: 100.2 F | BODY MASS INDEX: 30.73 KG/M2 | DIASTOLIC BLOOD PRESSURE: 90 MMHG | OXYGEN SATURATION: 96 % | RESPIRATION RATE: 16 BRPM | WEIGHT: 167 LBS | SYSTOLIC BLOOD PRESSURE: 162 MMHG | HEIGHT: 62 IN | HEART RATE: 100 BPM

## 2017-08-16 DIAGNOSIS — R07.89 CHEST PRESSURE: ICD-10-CM

## 2017-08-16 DIAGNOSIS — R07.9 CHEST PAIN, UNSPECIFIED TYPE: ICD-10-CM

## 2017-08-16 PROCEDURE — 99214 OFFICE O/P EST MOD 30 MIN: CPT | Performed by: NURSE PRACTITIONER

## 2017-08-16 ASSESSMENT — ENCOUNTER SYMPTOMS
MYALGIAS: 1
ABDOMINAL PAIN: 0
WHEEZING: 0
DIZZINESS: 0
SENSORY CHANGE: 0
CHILLS: 0
BACK PAIN: 1
NAUSEA: 0
SPUTUM PRODUCTION: 1
COUGH: 1
FEVER: 0
PALPITATIONS: 0
ORTHOPNEA: 1
VOMITING: 0
SHORTNESS OF BREATH: 1
TINGLING: 0
HEADACHES: 0
WEAKNESS: 0
FOCAL WEAKNESS: 0

## 2017-08-16 NOTE — MR AVS SNAPSHOT
"Samanta Lozano   2017 2:20 PM   Office Visit   MRN: [id number]    Department:  [facility]   Dept Phone:  [phone]    Description:  Female : 1935   Provider:  MELINA Thorpe           Reason for Visit     Chest Pain pressure on chest      Allergies as of 2017     Allergen Noted Reactions    Cefaclor 2008       Compazine 2008       Morphine 2008       Penicillins 2008       Sulfa Drugs 2008         You were diagnosed with     Chest pain, unspecified type   [6982768]       Chest pressure   [[id number]]         Vital Signs     Blood Pressure Pulse Temperature Respirations Height Weight    162/90 mmHg 100 37.9 °C (100.2 °F) 16 1.575 m (5' 2.01\") 75.751 kg (167 lb)    Body Mass Index Oxygen Saturation Smoking Status             30.54 kg/m2 96% Never Smoker          Basic Information     Date Of Birth Sex Race Ethnicity Preferred Language    1935 Female White Non- English      Your appointments     Oct 10, 2017 11:00 AM   Established Patient with Deepa Powell M.D.   [address] [address] [phone]           You will be receiving a confirmation call a few days before your appointment from our automated call confirmation system.              Problem List              ICD-10-CM Priority Class Noted - Resolved    Fracture of clavicle, closed S42.009A   1/15/2010 - Present    Paroxysmal atrial fibrillation (CMS-[facility]) I48.0   4/10/2017 - Present    Restless leg syndrome G25.81   4/10/2017 - Present    Essential hypertension I10   4/10/2017 - Present    Chronic diastolic congestive heart failure (CMS-[facility]) I50.32   4/10/2017 - Present    Chronic use of opiate drugs therapeutic purposes Z79.891   4/10/2017 - Present    Obesity (BMI 30-39.9) E66.9   4/10/2017 - Present      Health Maintenance        Date Due Completion Dates    IMM ZOSTER VACCINE 10/19/1995 ---    BONE DENSITY " 10/19/2000 ---    IMM PNEUMOCOCCAL 65+ (ADULT) LOW/MEDIUM RISK SERIES (1 of 2 - PCV13) 10/19/2000 ---    IMM INFLUENZA (1) 9/1/2017 ---    IMM DTaP/Tdap/Td Vaccine (2 - Td) 5/21/2020 5/21/2010            Current Immunizations     Tdap Vaccine 5/21/2010  4:47 PM      Below and/or attached are the medications your provider expects you to take. Review all of your home medications and newly ordered medications with your provider and/or pharmacist. Follow medication instructions as directed by your provider and/or pharmacist. Please keep your medication list with you and share with your provider. Update the information when medications are discontinued, doses are changed, or new medications (including over-the-counter products) are added; and carry medication information at all times in the event of emergency situations     Allergies:  CEFACLOR - (reactions not documented)     COMPAZINE - (reactions not documented)     MORPHINE - (reactions not documented)     PENICILLINS - (reactions not documented)     SULFA DRUGS - (reactions not documented)               Medications  Valid as of: August 16, 2017 -  2:56 PM    Generic Name Brand Name Tablet Size Instructions for use    Albuterol Sulfate (Nebu Soln) PROVENTIL 2.5mg/3ml 3 mL by Nebulization route every four hours as needed for Shortness of Breath.        Digoxin (Tab) LANOXIN 125 MCG Take 125 mcg by mouth every day.        Furosemide (Tab) LASIX 40 MG Take 1 Tab by mouth every day.        Gabapentin (Tab) NEURONTIN 600 MG Take 600 mg by mouth 3 times a day.        Losartan Potassium (Tab) COZAAR 50 MG Take 50 mg by mouth 2 Times a Day.        Mag Aspart-Potassium Aspart   Take 200 mg by mouth.        Metoprolol Succinate (TABLET SR 24 HR) TOPROL XL 50 MG Take 25 mg by mouth 2 Times a Day.        OxyCODONE HCl (Tab) ROXICODONE 10 MG Take 10 mg by mouth every four hours as needed for Moderate Pain.        Rivaroxaban (Tab) XARELTO 20 MG Take 1 Tab by mouth with dinner.         ROPINIRole HCl (Tab) REQUIP 1 MG Take 1 Tab by mouth every bedtime.        Triamcinolone Acetonide (Cream) KENALOG 0.1 % Apply to affected area BID        .                 Medicines prescribed today were sent to:     [id number] - [place] - [address] [facility] [address]    [address] [place] [address]    Phone: [phone] Fax: [phone]    Open 24 Hours?: No      Medication refill instructions:       If your prescription bottle indicates you have medication refills left, it is not necessary to call your provider’s office. Please contact your pharmacy and they will refill your medication.    If your prescription bottle indicates you do not have any refills left, you may request refills at any time through one of the following ways: The online TNC system (except Urgent Care), by calling your provider’s office, or by asking your pharmacy to contact your provider’s office with a refill request. Medication refills are processed only during regular business hours and may not be available until the next business day. Your provider may request additional information or to have a follow-up visit with you prior to refilling your medication.   *Please Note: Medication refills are assigned a new Rx number when refilled electronically. Your pharmacy may indicate that no refills were authorized even though a new prescription for the same medication is available at the pharmacy. Please request the medicine by name with the pharmacy before contacting your provider for a refill.           TNC Access Code: [email or web address]  Expires: 9/1/2017  4:15 AM    Your email address is not on file at [facility].  Email Addresses are required for you to sign up for TNC, please contact [phone] to verify your personal information and to provide your email address prior to attempting to register for TNC.    Samanta Lozano  [address]  [address]    SMX  A secure, online tool to manage your health information     [employer]’s One True Mediat® is a secure, online tool that connects you to your personalized health information from the privacy of your home -- day or night - making it very easy for you to manage your healthcare. Once the activation process is completed, you can even access your medical information using the SMX dona, which is available for free in the Apple Dona store or Google Play store.     To learn more about SMX, visit [email or web address]/One True Mediat    There are two levels of access available (as shown below):   My Chart Features  [facility] Primary Care Doctor [facility]  Specialists [facility]  Urgent  Care Non-[facility] Primary Care Doctor   Email your healthcare team securely and privately 24/7 X X X    Manage appointments: schedule your next appointment; view details of past/upcoming appointments X      Request prescription refills. X      View recent personal medical records, including lab and immunizations X X X X   View health record, including health history, allergies, medications X X X X   Read reports about your outpatient visits, procedures, consult and ER notes X X X X   See your discharge summary, which is a recap of your hospital and/or ER visit that includes your diagnosis, lab results, and care plan X X  X     How to register for SMX:  Once your e-mail address has been verified, follow the following steps to sign up for SMX.     1. Go to  [email or web address]  2. Click on the Sign Up Now box, which takes you to the New Member Sign Up page. You will need to provide the following information:  a. Enter your SMX Access Code exactly as it appears at the top of this page. (You will not need to use this code after you’ve completed the sign-up process. If you do not sign up before the expiration date, you must request a new code.)   b. Enter your date of birth.   c. Enter your home email address.   d. Click Submit, and  follow the next screen’s instructions.  3. Create a [employer] ID. This will be your [employer] login ID and cannot be changed, so think of one that is secure and easy to remember.  4. Create a [employer] password. You can change your password at any time.  5. Enter your Password Reset Question and Answer. This can be used at a later time if you forget your password.   6. Enter your e-mail address. This allows you to receive e-mail notifications when new information is available in [employer].  7. Click Sign Up. You can now view your health information.    For assistance activating your [employer] account, call [phone]

## 2017-08-16 NOTE — PROGRESS NOTES
"Subjective:      Samanta Lozano is a 81 y.o. female who presents with Chest Pain            Chest Pain   Associated symptoms include back pain, a cough, malaise/fatigue, orthopnea, shortness of breath and sputum production. Pertinent negatives include no abdominal pain, dizziness, fever, headaches, nausea, palpitations, vomiting or weakness.   Samanta is a 81 year old female who is here for chest pain and pressure x 4 days.  has been coughing up green phlegm and difficulty breathing. Had to use 's oxygen last night.  present. Denies dizziness, jaw pain, weakness.  has been \"in [place] all day running around\". H/o a-fib but denies heart palpitations, heart racing. [facility]als in [place] had Dr. Swan (cardiology) in [place] and will see her in 11/2017.  had mid back pain last night.    PMH:  has a past medical history of Anaphylactic shock and Pneumonia.  MEDS:   Current outpatient prescriptions:   •  triamcinolone acetonide (KENALOG) 0.1 % Cream, Apply to affected area BID, Disp: 1 Tube, Rfl: 0  •  ropinirole (REQUIP) 1 MG Tab, Take 1 Tab by mouth every bedtime., Disp: 90 Tab, Rfl: 1  •  digoxin (LANOXIN) 125 MCG Tab, Take 125 mcg by mouth every day., Disp: , Rfl:   •  rivaroxaban (XARELTO) 20 MG Tab tablet, Take 1 Tab by mouth with dinner., Disp: 90 Tab, Rfl: 1  •  furosemide (LASIX) 40 MG Tab, Take 1 Tab by mouth every day., Disp: 90 Tab, Rfl: 1  •  losartan (COZAAR) 50 MG Tab, Take 50 mg by mouth 2 Times a Day., Disp: , Rfl:   •  gabapentin (NEURONTIN) 600 MG tablet, Take 600 mg by mouth 3 times a day., Disp: , Rfl:   •  metoprolol SR (TOPROL XL) 50 MG TABLET SR 24 HR, Take 25 mg by mouth 2 Times a Day., Disp: , Rfl:   •  albuterol (PROVENTIL) 2.5mg/3ml Nebu Soln solution for nebulization, 3 mL by Nebulization route every four hours as needed for Shortness of Breath., Disp: 150 mL, Rfl: 0  •  Mag Aspart-Potassium Aspart (POTASSIUM & MAGNESIUM ASPARTAT PO), Take 200 mg by mouth., Disp: , Rfl: " "  •  oxycodone immediate release (ROXICODONE) 10 MG immediate release tablet, Take 10 mg by mouth every four hours as needed for Moderate Pain., Disp: , Rfl:   ALLERGIES:   Allergies   Allergen Reactions   • Cefaclor    • Compazine    • Morphine    • Penicillins    • Sulfa Drugs      SURGHX:   Past Surgical History   Procedure Laterality Date   • Other orthopedic surgery       bilat knee, C5-6 fusion, multiple ortho surgeries   • Gyn surgery     • Abdominal hysterectomy total     • Eye surgery Bilateral      SOCHX:  reports that she has never smoked. She has never used smokeless tobacco. She reports that she does not drink alcohol or use illicit drugs.  FH: Family history was reviewed, no pertinent findings to report      Review of Systems   Constitutional: Positive for malaise/fatigue. Negative for fever and chills.   Respiratory: Positive for cough, sputum production and shortness of breath. Negative for wheezing.    Cardiovascular: Positive for chest pain and orthopnea. Negative for palpitations and leg swelling.   Gastrointestinal: Negative for nausea, vomiting and abdominal pain.   Musculoskeletal: Positive for myalgias and back pain.   Neurological: Negative for dizziness, tingling, sensory change, focal weakness, weakness and headaches.   All other systems reviewed and are negative.         Objective:     /90 mmHg  Pulse 100  Temp(Src) 37.9 °C (100.2 °F)  Resp 16  Ht 1.575 m (5' 2.01\")  Wt 75.751 kg (167 lb)  BMI 30.54 kg/m2  SpO2 96%     Physical Exam   Constitutional: She appears well-developed and well-nourished. No distress.   HENT:   Head: Normocephalic.   Eyes: Conjunctivae and EOM are normal. Pupils are equal, round, and reactive to light.   Neck: Normal range of motion. Neck supple.   Cardiovascular: A regularly irregular rhythm present.   Pulses:       Radial pulses are 2+ on the right side, and 2+ on the left side.   Pulmonary/Chest: No accessory muscle usage. No tachypnea. No " "respiratory distress. She has decreased breath sounds in the right lower field and the left lower field. She has no wheezes. She has no rhonchi. She has no rales.   Musculoskeletal:   Walks without difficulty with walker and has a leg brace on her right knee   Skin: She is not diaphoretic.   Psychiatric: She has a normal mood and affect. Her speech is normal and behavior is normal. Judgment and thought content normal. She is not actively hallucinating. Cognition and memory are normal. She is attentive.   Vitals reviewed.       EKG: a-fib, LBBB, 2 box height of ST elevation in V2       Assessment/Plan:     1. Chest pain, unspecified type    - EKG - Clinic Performed  -  AMA/Refusal of Treatment    2. Chest pressure    - EKG - Clinic Performed  -  AMA/Refusal of Treatment    Refer to ER ASAP due to symptoms and EKG   Patient insists on going to [facility], refuses to go to [facility] ER  Patient and  states [facility] not go to [facility] (states this has happened before when they have called an ambulance)  Patient and  insist on going POV to [facility] ER, went over risks for this, both understand and insist on going POV  Called [facility] after 2 attempts and spoke to charge nurse who states she would take the call after requesting to speak to physician, notified me that they are \"slammed here\" and \"will see them when they get here\"   "

## 2017-10-03 ENCOUNTER — HOSPITAL ENCOUNTER (OUTPATIENT)
Dept: LAB | Facility: MEDICAL CENTER | Age: 82
End: 2017-10-03
Attending: NURSE PRACTITIONER
Payer: MEDICARE

## 2017-10-03 LAB
ANION GAP SERPL CALC-SCNC: 7 MMOL/L (ref 0–11.9)
BUN SERPL-MCNC: 11 MG/DL (ref 8–22)
CALCIUM SERPL-MCNC: 9 MG/DL (ref 8.5–10.5)
CHLORIDE SERPL-SCNC: 103 MMOL/L (ref 96–112)
CO2 SERPL-SCNC: 27 MMOL/L (ref 20–33)
CREAT SERPL-MCNC: 0.83 MG/DL (ref 0.5–1.4)
GFR SERPL CREATININE-BSD FRML MDRD: >60 ML/MIN/1.73 M 2
GLUCOSE SERPL-MCNC: 96 MG/DL (ref 65–99)
POTASSIUM SERPL-SCNC: 4 MMOL/L (ref 3.6–5.5)
SODIUM SERPL-SCNC: 137 MMOL/L (ref 135–145)

## 2017-10-03 PROCEDURE — 36415 COLL VENOUS BLD VENIPUNCTURE: CPT

## 2017-10-03 PROCEDURE — 80048 BASIC METABOLIC PNL TOTAL CA: CPT

## 2017-10-09 ENCOUNTER — TELEPHONE (OUTPATIENT)
Dept: MEDICAL GROUP | Age: 82
End: 2017-10-09

## 2017-10-09 NOTE — TELEPHONE ENCOUNTER
ESTABLISHED PATIENT PRE-VISIT PLANNING     Note: Patient will not be contacted if there is no indication to call.     1.  Reviewed notes from the last few office visits within the medical group: Yes    2.  If any orders were placed at last visit or intended to be done for this visit (i.e. 6 mos follow-up), do we have Results/Consult Notes?        •  Labs - Labs ordered, completed on 10/3/17 and results are in chart.   Note: If patient appointment is for lab review and patient did not complete labs,                check with provider if OK to reschedule patient until labs completed.       •  Imaging - Imaging was not ordered at last office visit.       •  Referrals - No referrals were ordered at last office visit.    3. Is this appointment scheduled as a Hospital Follow-Up? No    4.  Immunizations were updated in [facility] using WebIZ?: Yes       •  Web Iz Recommendations: FLU, TDAP and ZOSTAVAX (Shingles)    5.  Patient is due for the following Health Maintenance Topics:   Health Maintenance Due   Topic Date Due   • IMM ZOSTER VACCINE  10/19/1995   • BONE DENSITY  10/19/2000   • IMM PNEUMOCOCCAL 65+ (ADULT) LOW/MEDIUM RISK SERIES (2 of 2 - PCV13) 03/11/2016   • IMM INFLUENZA (1) 09/01/2017           6.  Patient was NOT informed to arrive 15 min prior to their scheduled appointment and bring in their medication bottles.

## 2017-11-01 ENCOUNTER — OFFICE VISIT (OUTPATIENT)
Dept: URGENT CARE | Facility: PHYSICIAN GROUP | Age: 82
End: 2017-11-01
Payer: MEDICARE

## 2017-11-01 ENCOUNTER — APPOINTMENT (OUTPATIENT)
Dept: RADIOLOGY | Facility: IMAGING CENTER | Age: 82
End: 2017-11-01
Attending: PHYSICIAN ASSISTANT
Payer: MEDICARE

## 2017-11-01 VITALS
BODY MASS INDEX: 31.47 KG/M2 | WEIGHT: 171 LBS | TEMPERATURE: 98.9 F | SYSTOLIC BLOOD PRESSURE: 142 MMHG | RESPIRATION RATE: 20 BRPM | DIASTOLIC BLOOD PRESSURE: 92 MMHG | HEART RATE: 128 BPM | HEIGHT: 62 IN | OXYGEN SATURATION: 98 %

## 2017-11-01 DIAGNOSIS — L08.9 SKIN INFECTION: ICD-10-CM

## 2017-11-01 DIAGNOSIS — R05.9 COUGH: ICD-10-CM

## 2017-11-01 DIAGNOSIS — J98.4 PNEUMONITIS: ICD-10-CM

## 2017-11-01 DIAGNOSIS — J22 ACUTE LOWER RESPIRATORY INFECTION: Primary | ICD-10-CM

## 2017-11-01 PROCEDURE — 71020 DX-CHEST-2 VIEWS: CPT | Mod: TC | Performed by: PHYSICIAN ASSISTANT

## 2017-11-01 PROCEDURE — 99214 OFFICE O/P EST MOD 30 MIN: CPT | Performed by: PHYSICIAN ASSISTANT

## 2017-11-01 RX ORDER — AZITHROMYCIN 250 MG/1
TABLET, FILM COATED ORAL
Qty: 6 TAB | Refills: 0 | Status: SHIPPED | OUTPATIENT
Start: 2017-11-01 | End: 2017-12-13

## 2017-11-01 RX ORDER — ALBUTEROL SULFATE 90 UG/1
2 AEROSOL, METERED RESPIRATORY (INHALATION) EVERY 4 HOURS PRN
Qty: 1 INHALER | Refills: 0 | Status: SHIPPED | OUTPATIENT
Start: 2017-11-01 | End: 2018-06-08

## 2017-11-01 NOTE — PROGRESS NOTES
Chief Complaint   Patient presents with   • Cough     yellow mucus, chest congestion-no fever/chills/bodyaches       HISTORY OF PRESENT ILLNESS: Patient is a 82 y.o. female who presents today becauseShe has had a cough for about 3 weeks. She is coughing up yellow phlegm. It has been getting worse over the last several days, so she came in for evaluation. She has not been taking any medications for her symptoms. Denies any fevers or chills. No nausea, vomiting or diarrhea.    Patient Active Problem List    Diagnosis Date Noted   • Paroxysmal atrial fibrillation (CMS-HCC) 04/10/2017   • Restless leg syndrome 04/10/2017   • Essential hypertension 04/10/2017   • Chronic diastolic congestive heart failure (CMS-HCC) 04/10/2017   • Chronic use of opiate drugs therapeutic purposes 04/10/2017   • Obesity (BMI 30-39.9) 04/10/2017   • Fracture of clavicle, closed 01/15/2010       Allergies:Cefaclor; Compazine; Morphine; Penicillins; and Sulfa drugs    Current Outpatient Prescriptions Ordered in [facility]   Medication Sig Dispense Refill   • azithromycin (ZITHROMAX) 250 MG Tab Follow package directions 6 Tab 0   • albuterol 108 (90 Base) MCG/ACT Aero Soln inhalation aerosol Inhale 2 Puffs by mouth every four hours as needed. 1 Inhaler 0   • triamcinolone acetonide (KENALOG) 0.1 % Cream Apply to affected area BID 1 Tube 0   • ropinirole (REQUIP) 1 MG Tab Take 1 Tab by mouth every bedtime. 90 Tab 1   • digoxin (LANOXIN) 125 MCG Tab Take 125 mcg by mouth every day.     • rivaroxaban (XARELTO) 20 MG Tab tablet Take 1 Tab by mouth with dinner. 90 Tab 1   • furosemide (LASIX) 40 MG Tab Take 1 Tab by mouth every day. 90 Tab 1   • losartan (COZAAR) 50 MG Tab Take 50 mg by mouth 2 Times a Day.     • gabapentin (NEURONTIN) 600 MG tablet Take 600 mg by mouth 3 times a day.     • metoprolol SR (TOPROL XL) 50 MG TABLET SR 24 HR Take 25 mg by mouth 2 Times a Day.     • albuterol (PROVENTIL) 2.5mg/3ml Nebu Soln solution for nebulization 3 mL by  "Nebulization route every four hours as needed for Shortness of Breath. 150 mL 0   • Mag Aspart-Potassium Aspart (POTASSIUM & MAGNESIUM ASPARTAT PO) Take 200 mg by mouth.     • oxycodone immediate release (ROXICODONE) 10 MG immediate release tablet Take 10 mg by mouth every four hours as needed for Moderate Pain.       No current Epic-ordered facility-administered medications on file.        Past Medical History:   Diagnosis Date   • Anaphylactic shock    • Pneumonia        Social History   Substance Use Topics   • Smoking status: Never Smoker   • Smokeless tobacco: Never Used   • Alcohol use No       Family Status   Relation Status   • Mother    • Father  at age 65    stomach   • Maternal Grandmother    • Maternal Grandfather    • Paternal Grandmother    • Paternal Grandfather      Family History   Problem Relation Age of Onset   • No Known Problems Mother    • Cancer Father 65     stomach   • No Known Problems Maternal Grandmother    • Heart Disease Maternal Grandfather    • No Known Problems Paternal Grandmother    • No Known Problems Paternal Grandfather        ROS:  Review of Systems   Constitutional: Negative for fever, chills, weight loss and malaise/fatigue.   HENT: Negative for ear pain, nosebleeds, congestion, sore throat and neck pain.    Eyes: Negative for blurred vision.   Respiratory:Positive for cough, sputum production, shortness of breath and wheezing.    Cardiovascular: Negative for chest pain, palpitations, orthopnea and leg swelling.   Gastrointestinal: Negative for heartburn, nausea, vomiting and abdominal pain.   Genitourinary: Negative for dysuria, urgency and frequency.     Exam:  Blood pressure 142/92, pulse (!) 128, temperature 37.2 °C (98.9 °F), resp. rate 20, height 1.575 m (5' 2\"), weight 77.6 kg (171 lb), SpO2 98 %.  General:  Well nourished, well developed female in NAD  Head:Normocephalic, atraumatic  Eyes: PERRLA, EOM within normal " limits, no conjunctival injection, no scleral icterus, visual fields and acuity grossly intact.  Ears: Normal shape and symmetry, no tenderness, no discharge. External canals are without any significant edema or erythema. Tympanic membranes are without any inflammation, no effusion. Gross auditory acuity is intact  Nose: Symmetrical without tenderness, no discharge.  Mouth: reasonable hygiene, no erythema exudates or tonsillar enlargement.  Neck: no masses, range of motion within normal limits, no tracheal deviation. No obvious thyroid enlargement.  Pulmonary: chest is symmetrical with respiration, scattered bilateral wheezes, bilateral inspiratory rales and expiratory rhonchi   Cardiovascular: regular rate and rhythm without murmurs, rubs, or gallops.  Extremities: no clubbing, cyanosis, or edema.    Chest x-ray per radiology interpretation:    Please note that this dictation was created using voice recognition software. I have made every reasonable attempt to correct obvious errors, but I expect that there are errors of grammar and possibly content that I did not discover before finalizing the note.    Assessment/Plan:  1. Acute lower respiratory infection  azithromycin (ZITHROMAX) 250 MG Tab    albuterol 108 (90 Base) MCG/ACT Aero Soln inhalation aerosol   2. Cough  DX-CHEST-2 VIEWS   3. Pneumonitis         Followup with primary care in the next 7-10 days if not significantly improving, return to the urgent care or go to the emergency room sooner for any worsening of symptoms.

## 2017-12-08 ENCOUNTER — OFFICE VISIT (OUTPATIENT)
Dept: URGENT CARE | Facility: PHYSICIAN GROUP | Age: 82
End: 2017-12-08
Payer: MEDICARE

## 2017-12-08 VITALS
HEIGHT: 62 IN | SYSTOLIC BLOOD PRESSURE: 120 MMHG | HEART RATE: 97 BPM | TEMPERATURE: 98.7 F | WEIGHT: 155 LBS | OXYGEN SATURATION: 97 % | DIASTOLIC BLOOD PRESSURE: 76 MMHG | BODY MASS INDEX: 28.52 KG/M2 | RESPIRATION RATE: 18 BRPM

## 2017-12-08 DIAGNOSIS — R09.81 NASAL CONGESTION: ICD-10-CM

## 2017-12-08 DIAGNOSIS — J02.9 SORE THROAT: ICD-10-CM

## 2017-12-08 DIAGNOSIS — R68.83 CHILLS: ICD-10-CM

## 2017-12-08 DIAGNOSIS — R30.0 DYSURIA: ICD-10-CM

## 2017-12-08 DIAGNOSIS — R52 BODY ACHES: ICD-10-CM

## 2017-12-08 LAB
FLUAV+FLUBV AG SPEC QL IA: NEGATIVE
INT CON NEG: NEGATIVE
INT CON POS: POSITIVE

## 2017-12-08 PROCEDURE — 99214 OFFICE O/P EST MOD 30 MIN: CPT | Performed by: PHYSICIAN ASSISTANT

## 2017-12-08 PROCEDURE — 87804 INFLUENZA ASSAY W/OPTIC: CPT | Performed by: PHYSICIAN ASSISTANT

## 2017-12-08 RX ORDER — NITROFURANTOIN 25; 75 MG/1; MG/1
100 CAPSULE ORAL EVERY 12 HOURS
Qty: 10 CAP | Refills: 0 | Status: SHIPPED | OUTPATIENT
Start: 2017-12-08 | End: 2017-12-13

## 2017-12-08 ASSESSMENT — PAIN SCALES - GENERAL: PAINLEVEL: NO PAIN

## 2017-12-08 NOTE — PROGRESS NOTES
Chief Complaint   Patient presents with   • Pharyngitis     stuffy head       HISTORY OF PRESENT ILLNESS: Patient is a 82 y.o. female who presents today for the following:    Patient comes in for evaluation of acute onset chills or body aches, and sore throat. Her symptoms started last night. She complains of associated nasal congestion but denies cough, headache, nausea, vomiting. She states she does not feel herself. She denies chest pain, abdominal pain, shortness of breath. Patient apparently had cardioversion almost one week ago for A. fib.    Patient Active Problem List    Diagnosis Date Noted   • Paroxysmal atrial fibrillation (CMS-HCC) 04/10/2017   • Restless leg syndrome 04/10/2017   • Essential hypertension 04/10/2017   • Chronic diastolic congestive heart failure (CMS-HCC) 04/10/2017   • Chronic use of opiate drugs therapeutic purposes 04/10/2017   • Obesity (BMI 30-39.9) 04/10/2017   • Fracture of clavicle, closed 01/15/2010       Allergies:Cefaclor; Compazine; Morphine; Penicillins; and Sulfa drugs    Current Outpatient Prescriptions Ordered in [facility]   Medication Sig Dispense Refill   • nitrofurantoin monohydr macro (MACROBID) 100 MG Cap Take 1 Cap by mouth every 12 hours for 5 days. 10 Cap 0   • azithromycin (ZITHROMAX) 250 MG Tab Follow package directions 6 Tab 0   • albuterol 108 (90 Base) MCG/ACT Aero Soln inhalation aerosol Inhale 2 Puffs by mouth every four hours as needed. 1 Inhaler 0   • mupirocin (BACTROBAN) 2 % Ointment Apply 1 Application to affected area(s) 2 times a day. 1 Tube 0   • triamcinolone acetonide (KENALOG) 0.1 % Cream Apply to affected area BID 1 Tube 0   • ropinirole (REQUIP) 1 MG Tab Take 1 Tab by mouth every bedtime. 90 Tab 1   • digoxin (LANOXIN) 125 MCG Tab Take 125 mcg by mouth every day.     • rivaroxaban (XARELTO) 20 MG Tab tablet Take 1 Tab by mouth with dinner. 90 Tab 1   • furosemide (LASIX) 40 MG Tab Take 1 Tab by mouth every day. 90 Tab 1   • losartan (COZAAR) 50 MG  Tab Take 50 mg by mouth 2 Times a Day.     • gabapentin (NEURONTIN) 600 MG tablet Take 600 mg by mouth 3 times a day.     • metoprolol SR (TOPROL XL) 50 MG TABLET SR 24 HR Take 25 mg by mouth 2 Times a Day.     • oxycodone immediate release (ROXICODONE) 10 MG immediate release tablet Take 10 mg by mouth every four hours as needed for Moderate Pain.     • albuterol (PROVENTIL) 2.5mg/3ml Nebu Soln solution for nebulization 3 mL by Nebulization route every four hours as needed for Shortness of Breath. 150 mL 0   • Mag Aspart-Potassium Aspart (POTASSIUM & MAGNESIUM ASPARTAT PO) Take 200 mg by mouth.       No current Epic-ordered facility-administered medications on file.        Past Medical History:   Diagnosis Date   • Anaphylactic shock    • Pneumonia        Social History   Substance Use Topics   • Smoking status: Never Smoker   • Smokeless tobacco: Never Used   • Alcohol use No       Family Status   Relation Status   • Mother    • Father  at age 65    stomach   • Maternal Grandmother    • Maternal Grandfather    • Paternal Grandmother    • Paternal Grandfather      Family History   Problem Relation Age of Onset   • No Known Problems Mother    • Cancer Father 65     stomach   • No Known Problems Maternal Grandmother    • Heart Disease Maternal Grandfather    • No Known Problems Paternal Grandmother    • No Known Problems Paternal Grandfather        ROS:    Review of Systems   Constitutional: Negative for weight loss and malaise/fatigue.   HENT: Negative for ear pain, nosebleeds,   and neck pain.    Eyes: Negative for blurred vision.   Respiratory: Negative for cough, sputum production, shortness of breath and wheezing.    Cardiovascular: Negative for chest pain, palpitations, orthopnea and leg swelling.   Gastrointestinal: Negative for heartburn, nausea, vomiting and abdominal pain.   Genitourinary:Positive for dysuria. Patient does not mention any urinary symptoms but  "after being asked realizes that she has been having some dysuria.      Exam:  Blood pressure 120/76, pulse 97, temperature 37.1 °C (98.7 °F), resp. rate 18, height 1.575 m (5' 2\"), weight 70.3 kg (155 lb), SpO2 97 %.  General: Well developed, well nourished. No distress.  HEENT: Conjunctiva clear, lids without ptosis, PERRL/EOMI. Ears normal shape and contour, canals are clear bilaterally, tympanic membranes are benign. Nasal mucosa benign. Oropharynx is without erythema, edema or exudates.Full dentures.  Pulmonary: Clear to ausculation and percussion.  Normal effort. No rales, ronchi, or wheezing.   Cardiovascular: Regular rate and rhythm without murmur. No edema.  Auscultated for one full minute with no evidence of A. Fib.  Back: No CVA tenderness noted.  Neurologic: Grossly nonfocal.  Lymph: No cervical lymphadenopathy noted.  Skin: Warm, dry, good turgor. No rashes in visible areas.   Psych: Normal mood. Alert and oriented x3. Judgment and insight is normal.    Rapid influenza: Negative    UA: Patient unable to leave a sample     X-rays unavailable at this time.    Assessment/Plan:  Discussed likely viral etiology of the respiratory symptoms. We'll treat for a UTI based on symptoms. Patient's GFR and creatinine are within normal limits from labs performed approximately 6 weeks ago. Patient is allergic to sulfa, penicillin, and cephalosporins. Patient had chest x-ray done if bodyaches and chills persist after 24-48 hours.  1. Chills  POCT Influenza A/B    DX-CHEST-2 VIEWS   2. Body aches  POCT Influenza A/B    DX-CHEST-2 VIEWS   3. Sore throat  POCT Influenza A/B   4. Nasal congestion  POCT Influenza A/B   5. Dysuria  nitrofurantoin monohydr macro (MACROBID) 100 MG Cap       "

## 2017-12-13 ENCOUNTER — OFFICE VISIT (OUTPATIENT)
Dept: URGENT CARE | Facility: PHYSICIAN GROUP | Age: 82
End: 2017-12-13
Payer: MEDICARE

## 2017-12-13 ENCOUNTER — HOSPITAL ENCOUNTER (OUTPATIENT)
Facility: MEDICAL CENTER | Age: 82
End: 2017-12-13
Attending: PHYSICIAN ASSISTANT
Payer: MEDICARE

## 2017-12-13 ENCOUNTER — APPOINTMENT (OUTPATIENT)
Dept: RADIOLOGY | Facility: IMAGING CENTER | Age: 82
End: 2017-12-13
Attending: PHYSICIAN ASSISTANT
Payer: MEDICARE

## 2017-12-13 VITALS
SYSTOLIC BLOOD PRESSURE: 156 MMHG | BODY MASS INDEX: 28.52 KG/M2 | TEMPERATURE: 99 F | RESPIRATION RATE: 16 BRPM | WEIGHT: 155 LBS | OXYGEN SATURATION: 97 % | HEIGHT: 62 IN | HEART RATE: 76 BPM | DIASTOLIC BLOOD PRESSURE: 80 MMHG

## 2017-12-13 DIAGNOSIS — R53.83 OTHER FATIGUE: ICD-10-CM

## 2017-12-13 DIAGNOSIS — R30.0 DYSURIA: ICD-10-CM

## 2017-12-13 DIAGNOSIS — R52 BODY ACHES: ICD-10-CM

## 2017-12-13 DIAGNOSIS — R68.83 CHILLS: ICD-10-CM

## 2017-12-13 DIAGNOSIS — J02.9 SORE THROAT: ICD-10-CM

## 2017-12-13 LAB
APPEARANCE UR: NORMAL
BILIRUB UR STRIP-MCNC: NORMAL MG/DL
COLOR UR AUTO: NORMAL
FLUAV+FLUBV AG SPEC QL IA: NEGATIVE
GLUCOSE UR STRIP.AUTO-MCNC: NORMAL MG/DL
INT CON NEG: NEGATIVE
INT CON NEG: NEGATIVE
INT CON POS: POSITIVE
INT CON POS: POSITIVE
KETONES UR STRIP.AUTO-MCNC: NORMAL MG/DL
LEUKOCYTE ESTERASE UR QL STRIP.AUTO: NORMAL
NITRITE UR QL STRIP.AUTO: NORMAL
PH UR STRIP.AUTO: 6.5 [PH] (ref 5–8)
PROT UR QL STRIP: NORMAL MG/DL
RBC UR QL AUTO: NORMAL
S PYO AG THROAT QL: NEGATIVE
SP GR UR STRIP.AUTO: 1.01
UROBILINOGEN UR STRIP-MCNC: NORMAL MG/DL

## 2017-12-13 PROCEDURE — 87880 STREP A ASSAY W/OPTIC: CPT | Performed by: PHYSICIAN ASSISTANT

## 2017-12-13 PROCEDURE — 87086 URINE CULTURE/COLONY COUNT: CPT

## 2017-12-13 PROCEDURE — 99214 OFFICE O/P EST MOD 30 MIN: CPT | Performed by: PHYSICIAN ASSISTANT

## 2017-12-13 PROCEDURE — 81002 URINALYSIS NONAUTO W/O SCOPE: CPT | Performed by: PHYSICIAN ASSISTANT

## 2017-12-13 PROCEDURE — 87804 INFLUENZA ASSAY W/OPTIC: CPT | Performed by: PHYSICIAN ASSISTANT

## 2017-12-13 PROCEDURE — 71020 DX-CHEST-2 VIEWS: CPT | Mod: TC | Performed by: PHYSICIAN ASSISTANT

## 2017-12-13 NOTE — PROGRESS NOTES
Chief Complaint   Patient presents with   • Pharyngitis       HISTORY OF PRESENT ILLNESS: Patient is a 82 y.o. female who presents today for the following:    Sore throat since yesterday  + body aches, chills, watery eyes, extreme fatigue   Denies significant cough, shortness of breath, nasal congestion, ear pain  Has not taken any over-the-counter medications  Daughter was diagnosed with strep throat a couple of days ago  Worsening pain with swallowing  Repeat cardioversion 12/9    Patient Active Problem List    Diagnosis Date Noted   • Paroxysmal atrial fibrillation (CMS-HCC) 04/10/2017   • Restless leg syndrome 04/10/2017   • Essential hypertension 04/10/2017   • Chronic diastolic congestive heart failure (CMS-HCC) 04/10/2017   • Chronic use of opiate drugs therapeutic purposes 04/10/2017   • Obesity (BMI 30-39.9) 04/10/2017   • Fracture of clavicle, closed 01/15/2010       Allergies:Cefaclor; Penicillins; Sulfa drugs; Compazine; and Morphine    Current Outpatient Prescriptions Ordered in [facility]   Medication Sig Dispense Refill   • nitrofurantoin monohydr macro (MACROBID) 100 MG Cap Take 1 Cap by mouth every 12 hours for 5 days. 10 Cap 0   • albuterol 108 (90 Base) MCG/ACT Aero Soln inhalation aerosol Inhale 2 Puffs by mouth every four hours as needed. 1 Inhaler 0   • ropinirole (REQUIP) 1 MG Tab Take 1 Tab by mouth every bedtime. 90 Tab 1   • digoxin (LANOXIN) 125 MCG Tab Take 125 mcg by mouth every day.     • rivaroxaban (XARELTO) 20 MG Tab tablet Take 1 Tab by mouth with dinner. 90 Tab 1   • furosemide (LASIX) 40 MG Tab Take 1 Tab by mouth every day. 90 Tab 1   • losartan (COZAAR) 50 MG Tab Take 50 mg by mouth 2 Times a Day.     • gabapentin (NEURONTIN) 600 MG tablet Take 600 mg by mouth 3 times a day.     • metoprolol SR (TOPROL XL) 50 MG TABLET SR 24 HR Take 25 mg by mouth 2 Times a Day.     • oxycodone immediate release (ROXICODONE) 10 MG immediate release tablet Take 10 mg by mouth every four hours as needed  "for Moderate Pain.     • albuterol (PROVENTIL) 2.5mg/3ml Nebu Soln solution for nebulization 3 mL by Nebulization route every four hours as needed for Shortness of Breath. 150 mL 0   • Mag Aspart-Potassium Aspart (POTASSIUM & MAGNESIUM ASPARTAT PO) Take 200 mg by mouth.       No current Epic-ordered facility-administered medications on file.        Past Medical History:   Diagnosis Date   • Anaphylactic shock    • Pneumonia        Social History   Substance Use Topics   • Smoking status: Never Smoker   • Smokeless tobacco: Never Used   • Alcohol use No       Family Status   Relation Status   • Mother    • Father  at age 65    stomach   • Maternal Grandmother    • Maternal Grandfather    • Paternal Grandmother    • Paternal Grandfather      Family History   Problem Relation Age of Onset   • No Known Problems Mother    • Cancer Father 65     stomach   • No Known Problems Maternal Grandmother    • Heart Disease Maternal Grandfather    • No Known Problems Paternal Grandmother    • No Known Problems Paternal Grandfather        ROS:    Review of Systems   Constitutional: Negative for weight loss and malaise/fatigue.   HENT: Negative for ear pain, nosebleeds, congestion, and neck pain.    Eyes: Negative for blurred vision.   Respiratory: Negative for cough, sputum production, shortness of breath and wheezing.    Cardiovascular: Negative for chest pain, palpitations, orthopnea and leg swelling.   Gastrointestinal: Negative for heartburn, nausea, vomiting and abdominal pain.   Genitourinary: Negative for dysuria, urgency and frequency.       Exam:  Blood pressure 156/80, pulse 76, temperature 37.2 °C (99 °F), resp. rate 16, height 1.575 m (5' 2\"), weight 70.3 kg (155 lb), SpO2 97 %.  General: Well developed, well nourished. No distress.  HEENT: Conjunctiva clear, lids without ptosis, PERRL/EOMI. Ears normal shape and contour, canals are clear bilaterally, tympanic membranes are " benign. Nasal mucosa benign. Oropharynx is without erythema, edema or exudates.Full dentures.  Pulmonary: Clear to ausculation and percussion.  Normal effort. No rales, ronchi, or wheezing.   Cardiovascular: Regular rate and rhythm without murmur. No edema.   Neurologic: Grossly nonfocal.  Lymph: No cervical lymphadenopathy noted.  Skin: Warm, dry, good turgor. No rashes in visible areas.   Psych: Normal mood. Alert and oriented x3. Judgment and insight is normal.    Rapid strep: negative    Rapid influenza: negative    UA: negative    CXR, per radiology:  Impression       1.  Mild cardiomegaly and mild perihilar congestive changes again noted.    2.  Blunting of costophrenic angles no longer identified.    3.  No new infiltrates or consolidations are identified.     Assessment/Plan:  Strep, influenza, UA, CXR negative for infection.  Discussed likely viral etiology. Will culture urine to r/o occult infection. Drink plenty of fluids. Discussed appropriate over-the-counter symptomatic medication, and when to return to clinic.  1. Dysuria  POCT Urinalysis    URINE CULTURE(NEW)   2. Chills  DX-CHEST-2 VIEWS    POCT Urinalysis    URINE CULTURE(NEW)   3. Body aches  POCT Influenza A/B    POCT Rapid Strep A    DX-CHEST-2 VIEWS    POCT Urinalysis    URINE CULTURE(NEW)   4. Sore throat  POCT Influenza A/B    POCT Rapid Strep A   5. Other fatigue  POCT Influenza A/B    POCT Rapid Strep A    POCT Urinalysis    URINE CULTURE(NEW)

## 2017-12-15 LAB
BACTERIA UR CULT: NORMAL
SIGNIFICANT IND 70042: NORMAL
SOURCE SOURCE: NORMAL

## 2017-12-21 ENCOUNTER — TELEPHONE (OUTPATIENT)
Dept: URGENT CARE | Facility: PHYSICIAN GROUP | Age: 82
End: 2017-12-21

## 2017-12-21 NOTE — TELEPHONE ENCOUNTER
----- Message from Mary Delatorre P.A.-C. sent at 12/16/2017 10:32 AM PST -----  Please let pt know the urine culture is negative. Follow up for persistent symptoms.

## 2017-12-26 ENCOUNTER — OFFICE VISIT (OUTPATIENT)
Dept: URGENT CARE | Facility: PHYSICIAN GROUP | Age: 82
End: 2017-12-26
Payer: MEDICARE

## 2017-12-26 VITALS
HEIGHT: 62 IN | DIASTOLIC BLOOD PRESSURE: 80 MMHG | RESPIRATION RATE: 16 BRPM | HEART RATE: 103 BPM | SYSTOLIC BLOOD PRESSURE: 140 MMHG | OXYGEN SATURATION: 97 % | BODY MASS INDEX: 29.44 KG/M2 | WEIGHT: 160 LBS | TEMPERATURE: 98.7 F

## 2017-12-26 DIAGNOSIS — R23.8 SKIN IRRITATION: ICD-10-CM

## 2017-12-26 DIAGNOSIS — B37.9 YEAST INFECTION: ICD-10-CM

## 2017-12-26 PROCEDURE — 99214 OFFICE O/P EST MOD 30 MIN: CPT | Performed by: PHYSICIAN ASSISTANT

## 2017-12-26 RX ORDER — TRIAMCINOLONE ACETONIDE 1 MG/G
CREAM TOPICAL
Qty: 1 TUBE | Refills: 0 | Status: SHIPPED | OUTPATIENT
Start: 2017-12-26 | End: 2018-06-08

## 2017-12-26 RX ORDER — FLUCONAZOLE 150 MG/1
TABLET ORAL
Qty: 2 TAB | Refills: 0 | Status: SHIPPED | OUTPATIENT
Start: 2017-12-26 | End: 2018-02-02

## 2017-12-27 ASSESSMENT — ENCOUNTER SYMPTOMS
SHORTNESS OF BREATH: 0
FEVER: 0
VAGINITIS: 1
COUGH: 0
CHILLS: 0
BACK PAIN: 0
FLANK PAIN: 0
PALPITATIONS: 0

## 2017-12-27 NOTE — PROGRESS NOTES
Subjective:      Samanta Lozano is a 82 y.o. female who presents with Vaginitis            Vaginitis   The patient's primary symptoms include genital itching. This is a new problem. The current episode started in the past 7 days. The problem has been unchanged. The pain is mild. She is not pregnant. Associated symptoms include rash. Pertinent negatives include no back pain, chills, dysuria, fever, flank pain, frequency, hematuria or urgency. Associated symptoms comments: Skin irritation on back  . She is not sexually active.       Review of Systems   Constitutional: Negative for chills and fever.   Respiratory: Negative for cough and shortness of breath.    Cardiovascular: Negative for chest pain and palpitations.   Genitourinary: Negative for dysuria, flank pain, frequency, hematuria and urgency.        Vaginal itching     Musculoskeletal: Negative for back pain.   Skin: Positive for itching and rash.   All other systems reviewed and are negative.    PMH:  has a past medical history of Anaphylactic shock and Pneumonia.  MEDS:   Current Outpatient Prescriptions:   •  fluconazole (DIFLUCAN) 150 MG tablet, Take 1 tablet.  Repeat in 72 hours if symptoms do not resolve., Disp: 2 Tab, Rfl: 0  •  triamcinolone acetonide (KENALOG) 0.1 % Cream, Apply to affected area twice daily for 10 days, Disp: 1 Tube, Rfl: 0  •  albuterol 108 (90 Base) MCG/ACT Aero Soln inhalation aerosol, Inhale 2 Puffs by mouth every four hours as needed., Disp: 1 Inhaler, Rfl: 0  •  ropinirole (REQUIP) 1 MG Tab, Take 1 Tab by mouth every bedtime., Disp: 90 Tab, Rfl: 1  •  digoxin (LANOXIN) 125 MCG Tab, Take 125 mcg by mouth every day., Disp: , Rfl:   •  rivaroxaban (XARELTO) 20 MG Tab tablet, Take 1 Tab by mouth with dinner., Disp: 90 Tab, Rfl: 1  •  furosemide (LASIX) 40 MG Tab, Take 1 Tab by mouth every day., Disp: 90 Tab, Rfl: 1  •  losartan (COZAAR) 50 MG Tab, Take 50 mg by mouth 2 Times a Day., Disp: , Rfl:   •  gabapentin (NEURONTIN) 600 MG  "tablet, Take 600 mg by mouth 3 times a day., Disp: , Rfl:   •  metoprolol SR (TOPROL XL) 50 MG TABLET SR 24 HR, Take 25 mg by mouth 2 Times a Day., Disp: , Rfl:   •  oxycodone immediate release (ROXICODONE) 10 MG immediate release tablet, Take 10 mg by mouth every four hours as needed for Moderate Pain., Disp: , Rfl:   •  albuterol (PROVENTIL) 2.5mg/3ml Nebu Soln solution for nebulization, 3 mL by Nebulization route every four hours as needed for Shortness of Breath., Disp: 150 mL, Rfl: 0  •  Mag Aspart-Potassium Aspart (POTASSIUM & MAGNESIUM ASPARTAT PO), Take 200 mg by mouth., Disp: , Rfl:   ALLERGIES:   Allergies   Allergen Reactions   • Cefaclor Anaphylaxis   • Penicillins Anaphylaxis   • Sulfa Drugs Anaphylaxis   • Compazine    • Morphine      SURGHX:   Past Surgical History:   Procedure Laterality Date   • ABDOMINAL HYSTERECTOMY TOTAL     • EYE SURGERY Bilateral    • GYN SURGERY     • OTHER ORTHOPEDIC SURGERY      bilat knee, C5-6 fusion, multiple ortho surgeries     SOCHX:  reports that she has never smoked. She has never used smokeless tobacco. She reports that she does not drink alcohol or use drugs.  FH: Family history was reviewed, no pertinent findings to report  Medications, Allergies, and current problem list reviewed today in Epic       Objective:     /80   Pulse (!) 103   Temp 37.1 °C (98.7 °F)   Resp 16   Ht 1.575 m (5' 2\")   Wt 72.6 kg (160 lb)   SpO2 97%   BMI 29.26 kg/m²      Physical Exam   Constitutional: She appears well-developed and well-nourished.   Cardiovascular: Normal rate, regular rhythm and normal heart sounds.    Pulmonary/Chest: Effort normal and breath sounds normal.   Skin: Skin is warm and dry. Rash noted.        Psychiatric: She has a normal mood and affect. Her behavior is normal. Judgment and thought content normal.   Vitals reviewed.              Assessment/Plan:   Pt declines urine sample or pelvic exam.  Skin irritation noted on lumbar area.      1. Yeast " infection    - fluconazole (DIFLUCAN) 150 MG tablet; Take 1 tablet.  Repeat in 72 hours if symptoms do not resolve.  Dispense: 2 Tab; Refill: 0    2. Skin irritation    - triamcinolone acetonide (KENALOG) 0.1 % Cream; Apply to affected area twice daily for 10 days  Dispense: 1 Tube; Refill: 0    Differential diagnosis, natural history, supportive care, and indications for immediate follow-up discussed at length.   Follow-up with primary care provider within 4-5 days, emergency room precautions discussed.  Patient and/or family appears understanding of information.

## 2017-12-29 ENCOUNTER — APPOINTMENT (OUTPATIENT)
Dept: RADIOLOGY | Facility: IMAGING CENTER | Age: 82
End: 2017-12-29
Attending: NURSE PRACTITIONER
Payer: MEDICARE

## 2017-12-29 ENCOUNTER — OFFICE VISIT (OUTPATIENT)
Dept: URGENT CARE | Facility: PHYSICIAN GROUP | Age: 82
End: 2017-12-29
Payer: MEDICARE

## 2017-12-29 VITALS
OXYGEN SATURATION: 95 % | HEART RATE: 74 BPM | WEIGHT: 163 LBS | HEIGHT: 62 IN | TEMPERATURE: 97.3 F | SYSTOLIC BLOOD PRESSURE: 130 MMHG | RESPIRATION RATE: 14 BRPM | BODY MASS INDEX: 30 KG/M2 | DIASTOLIC BLOOD PRESSURE: 84 MMHG

## 2017-12-29 DIAGNOSIS — R09.81 NASAL CONGESTION WITH RHINORRHEA: ICD-10-CM

## 2017-12-29 DIAGNOSIS — R05.8 PRODUCTIVE COUGH: ICD-10-CM

## 2017-12-29 DIAGNOSIS — J34.89 NASAL CONGESTION WITH RHINORRHEA: ICD-10-CM

## 2017-12-29 DIAGNOSIS — R06.02 SOB (SHORTNESS OF BREATH): ICD-10-CM

## 2017-12-29 DIAGNOSIS — R60.0 BILATERAL LEG EDEMA: ICD-10-CM

## 2017-12-29 DIAGNOSIS — Z86.79 H/O CHF: ICD-10-CM

## 2017-12-29 PROCEDURE — 99215 OFFICE O/P EST HI 40 MIN: CPT | Performed by: NURSE PRACTITIONER

## 2017-12-29 PROCEDURE — 71020 DX-CHEST-2 VIEWS: CPT | Mod: TC | Performed by: NURSE PRACTITIONER

## 2017-12-29 ASSESSMENT — ENCOUNTER SYMPTOMS
DIAPHORESIS: 0
CONSTIPATION: 0
DIARRHEA: 0
SORE THROAT: 0
FEVER: 0
SHORTNESS OF BREATH: 1
VOMITING: 0
CHILLS: 0
BACK PAIN: 1
DIZZINESS: 0
ABDOMINAL PAIN: 0
HEADACHES: 0
MYALGIAS: 1
NAUSEA: 0
SINUS PAIN: 0
WEAKNESS: 1
ORTHOPNEA: 1
COUGH: 1
WHEEZING: 0
PALPITATIONS: 0
SPUTUM PRODUCTION: 0

## 2017-12-30 NOTE — PROGRESS NOTES
Subjective:      Samanta Lozano is a 82 y.o. female who presents with Cough (Pt concerned about pneumonia on L/ side)            HPI  Samanta is here for cough x 3 days.  present. C/o left side chest/back pain with inspiration. Nonproductive cough. SOB without chest tightness or wheeze. Denies fever. Nasal congestion/runny nose. Right ear pain.States lower leg swelling. H/o CHF. Has not taken her Lasix in 2 days. Has oxygen at home but not using. H/o pneumonia. Has been seen in  3x in the last 2 months. Previous CXR showing 'bilateral effusion, CHF, pneumonitis, congestive changes'. Fatigue. Has not taken any meds for cough.    PMH:  has a past medical history of Anaphylactic shock and Pneumonia.  MEDS:   Current Outpatient Prescriptions:   •  ropinirole (REQUIP) 1 MG Tab, Take 1 Tab by mouth every bedtime., Disp: 90 Tab, Rfl: 1  •  digoxin (LANOXIN) 125 MCG Tab, Take 125 mcg by mouth every day., Disp: , Rfl:   •  rivaroxaban (XARELTO) 20 MG Tab tablet, Take 1 Tab by mouth with dinner., Disp: 90 Tab, Rfl: 1  •  furosemide (LASIX) 40 MG Tab, Take 1 Tab by mouth every day., Disp: 90 Tab, Rfl: 1  •  losartan (COZAAR) 50 MG Tab, Take 50 mg by mouth 2 Times a Day., Disp: , Rfl:   •  gabapentin (NEURONTIN) 600 MG tablet, Take 600 mg by mouth 3 times a day., Disp: , Rfl:   •  metoprolol SR (TOPROL XL) 50 MG TABLET SR 24 HR, Take 25 mg by mouth 2 Times a Day., Disp: , Rfl:   •  albuterol (PROVENTIL) 2.5mg/3ml Nebu Soln solution for nebulization, 3 mL by Nebulization route every four hours as needed for Shortness of Breath., Disp: 150 mL, Rfl: 0  •  Mag Aspart-Potassium Aspart (POTASSIUM & MAGNESIUM ASPARTAT PO), Take 200 mg by mouth., Disp: , Rfl:   •  fluconazole (DIFLUCAN) 150 MG tablet, Take 1 tablet.  Repeat in 72 hours if symptoms do not resolve., Disp: 2 Tab, Rfl: 0  •  triamcinolone acetonide (KENALOG) 0.1 % Cream, Apply to affected area twice daily for 10 days, Disp: 1 Tube, Rfl: 0  •  albuterol 108 (90  "Base) MCG/ACT Aero Soln inhalation aerosol, Inhale 2 Puffs by mouth every four hours as needed., Disp: 1 Inhaler, Rfl: 0  •  oxycodone immediate release (ROXICODONE) 10 MG immediate release tablet, Take 10 mg by mouth every four hours as needed for Moderate Pain., Disp: , Rfl:   ALLERGIES:   Allergies   Allergen Reactions   • Cefaclor Anaphylaxis   • Penicillins Anaphylaxis   • Sulfa Drugs Anaphylaxis   • Compazine    • Morphine      SURGHX:   Past Surgical History:   Procedure Laterality Date   • ABDOMINAL HYSTERECTOMY TOTAL     • EYE SURGERY Bilateral    • GYN SURGERY     • OTHER ORTHOPEDIC SURGERY      bilat knee, C5-6 fusion, multiple ortho surgeries     SOCHX:  reports that she is a non-smoker but has been exposed to tobacco smoke. She has never used smokeless tobacco. She reports that she does not drink alcohol or use drugs.  FH: Family history was reviewed, no pertinent findings to report    Review of Systems   Constitutional: Positive for malaise/fatigue. Negative for chills, diaphoresis and fever.   HENT: Positive for congestion. Negative for ear pain, sinus pain and sore throat.    Respiratory: Positive for cough and shortness of breath. Negative for sputum production and wheezing.    Cardiovascular: Positive for chest pain, orthopnea and leg swelling. Negative for palpitations.   Gastrointestinal: Negative for abdominal pain, constipation, diarrhea, nausea and vomiting.   Musculoskeletal: Positive for back pain and myalgias.   Neurological: Positive for weakness. Negative for dizziness and headaches.   All other systems reviewed and are negative.         Objective:     /84   Pulse 74   Temp 36.3 °C (97.3 °F)   Resp 14   Ht 1.575 m (5' 2\")   Wt 73.9 kg (163 lb)   SpO2 95%   BMI 29.81 kg/m²      Physical Exam   Constitutional: She is oriented to person, place, and time. She appears well-developed and well-nourished. She is active and cooperative.  Non-toxic appearance. She does not have a " sickly appearance. She appears ill. No distress.   HENT:   Head: Normocephalic.   Right Ear: External ear and ear canal normal. Tympanic membrane is injected.   Left Ear: External ear and ear canal normal. Tympanic membrane is injected.   Nose: Mucosal edema and rhinorrhea present. No sinus tenderness.   Mouth/Throat: Uvula is midline and oropharynx is clear and moist. Mucous membranes are dry. No uvula swelling.   Eyes: Conjunctivae and EOM are normal. Pupils are equal, round, and reactive to light.   Neck: Normal range of motion. Neck supple.   Cardiovascular: Normal rate and regular rhythm.    Pulmonary/Chest: Effort normal. No accessory muscle usage. No respiratory distress. She has decreased breath sounds in the left upper field, the left middle field and the left lower field. She has no rhonchi. She has no rales.   Musculoskeletal: Normal range of motion.   Neurological: She is alert and oriented to person, place, and time.   Skin: Skin is warm and dry. She is not diaphoretic.   Vitals reviewed.         CXR: 1.  Interstitial prominence with trace amounts of fluid and borderline enlarged heart most suggestive of interstitial edema.  2.  There is no focal pneumonia.     Assessment/Plan:     1. Productive cough    - DX-CHEST-2 VIEWS; Future    2. SOB (shortness of breath)      3. Nasal congestion with rhinorrhea      4. Bilateral leg edema      5. H/O CHF    Refer to ER for SOB, left side chest/back pain with inspiration, LE edema, patient and  agree to this plan of care  Patient stable  Patient prefers to go to [facility] ER for treatment,  to take via POV

## 2018-02-02 ENCOUNTER — OFFICE VISIT (OUTPATIENT)
Dept: URGENT CARE | Facility: PHYSICIAN GROUP | Age: 83
End: 2018-02-02
Payer: MEDICARE

## 2018-02-02 ENCOUNTER — HOSPITAL ENCOUNTER (OUTPATIENT)
Facility: MEDICAL CENTER | Age: 83
End: 2018-02-02
Attending: NURSE PRACTITIONER
Payer: MEDICARE

## 2018-02-02 VITALS
HEART RATE: 74 BPM | OXYGEN SATURATION: 94 % | TEMPERATURE: 98.5 F | HEIGHT: 62 IN | SYSTOLIC BLOOD PRESSURE: 130 MMHG | RESPIRATION RATE: 16 BRPM | DIASTOLIC BLOOD PRESSURE: 80 MMHG | WEIGHT: 160 LBS | BODY MASS INDEX: 29.44 KG/M2

## 2018-02-02 DIAGNOSIS — R82.998 LEUKOCYTES IN URINE: ICD-10-CM

## 2018-02-02 DIAGNOSIS — B37.31 VAGINAL CANDIDIASIS: ICD-10-CM

## 2018-02-02 DIAGNOSIS — J06.9 UPPER RESPIRATORY TRACT INFECTION, UNSPECIFIED TYPE: ICD-10-CM

## 2018-02-02 PROCEDURE — 87086 URINE CULTURE/COLONY COUNT: CPT

## 2018-02-02 PROCEDURE — 99214 OFFICE O/P EST MOD 30 MIN: CPT | Performed by: NURSE PRACTITIONER

## 2018-02-02 RX ORDER — FLUCONAZOLE 150 MG/1
TABLET ORAL
Qty: 2 TAB | Refills: 0 | Status: SHIPPED | OUTPATIENT
Start: 2018-02-02 | End: 2018-06-08

## 2018-02-02 ASSESSMENT — ENCOUNTER SYMPTOMS
FEVER: 0
ORTHOPNEA: 0
WHEEZING: 0
NAUSEA: 0
HEADACHES: 0
SHORTNESS OF BREATH: 0
DIZZINESS: 0
PALPITATIONS: 0
SPUTUM PRODUCTION: 0
COUGH: 0
VOMITING: 0
CHILLS: 0

## 2018-02-02 ASSESSMENT — LIFESTYLE VARIABLES: SUBSTANCE_ABUSE: 0

## 2018-02-03 DIAGNOSIS — R82.998 LEUKOCYTES IN URINE: ICD-10-CM

## 2018-02-03 NOTE — PROGRESS NOTES
"Subjective:      Samanta Lozano is a 82 y.o. female who presents with Vaginitis (Possible Cold Sx, yeast infection)      PFSH reviewed and updated as necessary in EPIC electronic record with patient today.     Neg DM hx      On Xarelto for Afib.   Medications including OTC medications reviewed with patient.         Allergies   Allergen Reactions   • Cefaclor Anaphylaxis   • Penicillins Anaphylaxis   • Sulfa Drugs Anaphylaxis   • Compazine    • Morphine              HPI this is a recurrent problem. C/O 'another yeast infection\".  Severe itching in her vaginal area with milk he white discharge. Has been getting frequent vaginal yeast infections. Diflucan has worked in the past for her . Treatments tried none    She also has some cold symptoms of runny nose (clear drainage), scratchy throat.  No other aggravating or alleviating factors. Denies shortness of breath, orthopnea, chest pain, productive sputum, cough, fever or headache. Although last night she felt like she \"was going to get short of breath\" but never did Treatments tried none      Review of Systems   Constitutional: Negative for chills, fever and malaise/fatigue.   Respiratory: Negative for cough, sputum production, shortness of breath and wheezing.    Cardiovascular: Negative for chest pain, palpitations, orthopnea and leg swelling.   Gastrointestinal: Negative for nausea and vomiting.   Neurological: Negative for dizziness and headaches.   Psychiatric/Behavioral: Negative for substance abuse.          Objective:     /80   Pulse 74   Temp 36.9 °C (98.5 °F)   Resp 16   Ht 1.575 m (5' 2\")   Wt 72.6 kg (160 lb)   SpO2 94%   BMI 29.26 kg/m²      Physical Exam   Constitutional: She is oriented to person, place, and time. Vital signs are normal. She appears well-developed and well-nourished.   HENT:   Head: Normocephalic.   Cardiovascular: Normal rate, regular rhythm and normal heart sounds.    Pulmonary/Chest: Effort normal and breath sounds " normal. No respiratory distress. She has no wheezes. She has no rales.   Neurological: She is alert and oriented to person, place, and time.   Skin: Skin is warm. Capillary refill takes less than 2 seconds.   Psychiatric: She has a normal mood and affect. Her behavior is normal.   Nursing note and vitals reviewed.       UA: mod leuk           Assessment/Plan:     1. Vaginal candidiasis     2. Upper respiratory tract infection, unspecified type  fluconazole (DIFLUCAN) 150 MG tablet   3. Leukocytes in urine  URINE CULTURE(NEW)     Educated in proper administration of medication(s) ordered today including safety, possible SE, risks, benefits, rationale and alternatives to therapy.   OTC miconozole cream to be used topically for symptomatic relief of symptoms.   FU with PCP regarding frequent yeast infections.   Educated in proper administration of medication(s) ordered today including safety, possible SE, risks, benefits, rationale and alternatives to therapy.     FU prn resp symptoms. New or worsening symptoms or if no improvement in 5 days.

## 2018-02-05 LAB
BACTERIA UR CULT: NORMAL
SIGNIFICANT IND 70042: NORMAL
SITE SITE: NORMAL
SOURCE SOURCE: NORMAL

## 2018-03-12 ENCOUNTER — OFFICE VISIT (OUTPATIENT)
Dept: URGENT CARE | Facility: PHYSICIAN GROUP | Age: 83
End: 2018-03-12
Payer: MEDICARE

## 2018-03-12 VITALS
SYSTOLIC BLOOD PRESSURE: 164 MMHG | HEIGHT: 62 IN | WEIGHT: 155 LBS | TEMPERATURE: 97.1 F | RESPIRATION RATE: 20 BRPM | BODY MASS INDEX: 28.52 KG/M2 | HEART RATE: 138 BPM | OXYGEN SATURATION: 98 % | DIASTOLIC BLOOD PRESSURE: 92 MMHG

## 2018-03-12 DIAGNOSIS — J22 LRTI (LOWER RESPIRATORY TRACT INFECTION): ICD-10-CM

## 2018-03-12 PROCEDURE — 99214 OFFICE O/P EST MOD 30 MIN: CPT | Performed by: PHYSICIAN ASSISTANT

## 2018-03-12 RX ORDER — LEVOFLOXACIN 500 MG/1
500 TABLET, FILM COATED ORAL DAILY
Qty: 10 TAB | Refills: 0 | Status: SHIPPED | OUTPATIENT
Start: 2018-03-12 | End: 2018-03-22

## 2018-03-12 RX ORDER — ALBUTEROL SULFATE 90 UG/1
2 AEROSOL, METERED RESPIRATORY (INHALATION) EVERY 4 HOURS PRN
Qty: 1 INHALER | Refills: 0 | Status: SHIPPED | OUTPATIENT
Start: 2018-03-12 | End: 2018-06-08

## 2018-03-12 NOTE — PROGRESS NOTES
Chief Complaint   Patient presents with   • Cough     x 1 week; sob,        HISTORY OF PRESENT ILLNESS: Patient is a 82 y.o. female who presents today because she has a one-week history of cough, yellowish-green phlegm production, some shortness of breath. She has been taking some over-the-counter cough medications for her symptoms without improvement.    Patient Active Problem List    Diagnosis Date Noted   • Paroxysmal atrial fibrillation (CMS-HCC) 04/10/2017   • Restless leg syndrome 04/10/2017   • Essential hypertension 04/10/2017   • Chronic diastolic congestive heart failure (CMS-HCC) 04/10/2017   • Chronic use of opiate drugs therapeutic purposes 04/10/2017   • Obesity (BMI 30-39.9) 04/10/2017   • Fracture of clavicle, closed 01/15/2010       Allergies:Cefaclor; Penicillins; Sulfa drugs; Compazine; and Morphine    Current Outpatient Prescriptions Ordered in [facility]   Medication Sig Dispense Refill   • levoFLOXacin (LEVAQUIN) 500 MG tablet Take 1 Tab by mouth every day for 10 days. 10 Tab 0   • albuterol 108 (90 Base) MCG/ACT Aero Soln inhalation aerosol Inhale 2 Puffs by mouth every four hours as needed. 1 Inhaler 0   • ropinirole (REQUIP) 1 MG Tab Take 1 Tab by mouth every bedtime. 90 Tab 1   • rivaroxaban (XARELTO) 20 MG Tab tablet Take 1 Tab by mouth with dinner. 90 Tab 1   • losartan (COZAAR) 50 MG Tab Take 50 mg by mouth 2 Times a Day.     • gabapentin (NEURONTIN) 600 MG tablet Take 600 mg by mouth 3 times a day.     • metoprolol SR (TOPROL XL) 50 MG TABLET SR 24 HR Take 25 mg by mouth 2 Times a Day.     • oxycodone immediate release (ROXICODONE) 10 MG immediate release tablet Take 10 mg by mouth every four hours as needed for Moderate Pain.     • fluconazole (DIFLUCAN) 150 MG tablet Take 1 tablet now and repeat in 1 week. 2 Tab 0   • triamcinolone acetonide (KENALOG) 0.1 % Cream Apply to affected area twice daily for 10 days (Patient not taking: Reported on 3/12/2018) 1 Tube 0   • albuterol 108 (90 Base)  MCG/ACT Aero Soln inhalation aerosol Inhale 2 Puffs by mouth every four hours as needed. (Patient not taking: Reported on 3/12/2018) 1 Inhaler 0   • digoxin (LANOXIN) 125 MCG Tab Take 125 mcg by mouth every day.     • furosemide (LASIX) 40 MG Tab Take 1 Tab by mouth every day. 90 Tab 1   • albuterol (PROVENTIL) 2.5mg/3ml Nebu Soln solution for nebulization 3 mL by Nebulization route every four hours as needed for Shortness of Breath. (Patient not taking: Reported on 3/12/2018) 150 mL 0   • Mag Aspart-Potassium Aspart (POTASSIUM & MAGNESIUM ASPARTAT PO) Take 200 mg by mouth.       No current Epic-ordered facility-administered medications on file.        Past Medical History:   Diagnosis Date   • Anaphylactic shock    • Pneumonia        Social History   Substance Use Topics   • Smoking status: Passive Smoke Exposure - Never Smoker   • Smokeless tobacco: Never Used   • Alcohol use No       Family Status   Relation Status   • Mother    • Father  at age 65    stomach   • Maternal Grandmother    • Maternal Grandfather    • Paternal Grandmother    • Paternal Grandfather      Family History   Problem Relation Age of Onset   • No Known Problems Mother    • Cancer Father 65     stomach   • No Known Problems Maternal Grandmother    • Heart Disease Maternal Grandfather    • No Known Problems Paternal Grandmother    • No Known Problems Paternal Grandfather        ROS:  Review of Systems   Constitutional: Negative for fever, chills, weight loss and malaise/fatigue.   HENT: Negative for ear pain, nosebleeds, congestion, sore throat and neck pain.    Eyes: Negative for blurred vision.   Respiratory: Positive for cough, sputum production, shortness of breath and no wheezing.    Cardiovascular: Negative for chest pain, palpitations, orthopnea and leg swelling.   Gastrointestinal: Negative for heartburn, nausea, vomiting and abdominal pain.   Genitourinary: Negative for dysuria, urgency  "and frequency.     Exam:  Blood pressure (!) 164/92, pulse (!) 138, temperature 36.2 °C (97.1 °F), resp. rate 20, height 1.575 m (5' 2\"), weight 70.3 kg (155 lb), SpO2 98 %.  General:  Well nourished, well developed female in NAD  Head:Normocephalic, atraumatic  Eyes: PERRLA, EOM within normal limits, no conjunctival injection, no scleral icterus, visual fields and acuity grossly intact.  Ears: Normal shape and symmetry, no tenderness, no discharge. External canals are without any significant edema or erythema. Tympanic membranes are without any inflammation, no effusion. Gross auditory acuity is intact  Nose: Symmetrical without tenderness, no discharge.  Mouth: reasonable hygiene, no erythema exudates or tonsillar enlargement.  Neck: no masses, range of motion within normal limits, no tracheal deviation. No obvious thyroid enlargement.  Pulmonary: chest is symmetrical with respiration, inspiratory rales in the left lower lobe Cardiovascular: Tachycardic rate, regular rhythm without murmurs, rubs, or gallops.  Extremities: no clubbing, cyanosis, or edema.    Please note that this dictation was created using voice recognition software. I have made every reasonable attempt to correct obvious errors, but I expect that there are errors of grammar and possibly content that I did not discover before finalizing the note.    Assessment/Plan:  1. LRTI (lower respiratory tract infection)  levoFLOXacin (LEVAQUIN) 500 MG tablet    albuterol 108 (90 Base) MCG/ACT Aero Soln inhalation aerosol   Discussed the concern for her elevated blood pressure and elevated heart rate, if she did not have significant improvement over the next 24 hours or  if her symptoms worsen, I directed her to go directly to the emergency room or call 911     Followup with primary care in the next 7-10 days if not significantly improving, return to the urgent care or go to the emergency room sooner for any worsening of symptoms.       "

## 2018-04-17 PROBLEM — Z02.89 PAIN MANAGEMENT CONTRACT AGREEMENT: Status: ACTIVE | Noted: 2017-04-10

## 2018-06-08 ENCOUNTER — OFFICE VISIT (OUTPATIENT)
Dept: URGENT CARE | Facility: PHYSICIAN GROUP | Age: 83
End: 2018-06-08
Payer: MEDICARE

## 2018-06-08 VITALS
RESPIRATION RATE: 18 BRPM | HEART RATE: 113 BPM | WEIGHT: 167 LBS | TEMPERATURE: 97.2 F | DIASTOLIC BLOOD PRESSURE: 78 MMHG | BODY MASS INDEX: 30.73 KG/M2 | OXYGEN SATURATION: 97 % | HEIGHT: 62 IN | SYSTOLIC BLOOD PRESSURE: 132 MMHG

## 2018-06-08 DIAGNOSIS — S20.221A CONTUSION OF RIGHT SIDE OF BACK, INITIAL ENCOUNTER: ICD-10-CM

## 2018-06-08 DIAGNOSIS — J22 LOWER RESPIRATORY INFECTION: ICD-10-CM

## 2018-06-08 PROCEDURE — 99214 OFFICE O/P EST MOD 30 MIN: CPT | Performed by: FAMILY MEDICINE

## 2018-06-08 RX ORDER — LEVOFLOXACIN 500 MG/1
TABLET, FILM COATED ORAL
Qty: 10 TAB | Refills: 0 | Status: SHIPPED | OUTPATIENT
Start: 2018-06-08 | End: 2018-06-14

## 2018-06-08 ASSESSMENT — PAIN SCALES - GENERAL: PAINLEVEL: 7=MODERATE-SEVERE PAIN

## 2018-06-09 NOTE — PROGRESS NOTES
Chief Complaint:    Chief Complaint   Patient presents with   • Fall     1 week ago   • Rib Injury   • Hip Injury       History of Present Illness:     present. These are new problems.    Her main concern is that for 1 week, she has cough productive of purulent mucus and pain in left lower lung. She feels she has pneumonia. Levofloxacin 500 mg x 10 days worked/tolerated for similar symptoms 3/12/18. She reports she has needed fluid drained out from her lung in the past. Today she declines CXR and feels treating with antibiotic will be sufficient.    She fell about 1 week ago and injured her right lower back and flank. There is some bruising right flank. She reports all these areas of pain are improving slowly, but areas still are at least moderately painful. She does not feel she broke any bones and declines imaging evaluation. She has Oxycodone and Gabapentin to take for pain.      Review of Systems:    Constitutional: Negative for fever, chills, and diaphoresis.   Eyes: Negative for change in vision, photophobia, pain, redness, and discharge.  ENT: Negative for ear pain, ear discharge, hearing loss, tinnitus, nasal congestion, nosebleeds, and sore throat.    Respiratory: See HPI.  Cardiovascular: Negative for chest pain, palpitations, orthopnea, claudication, leg swelling, and PND.   Gastrointestinal: Negative for abdominal pain, nausea, vomiting, diarrhea, constipation, blood in stool, and melena.   Genitourinary: Negative for dysuria, urinary urgency, urinary frequency, hematuria, and flank pain.   Musculoskeletal: See HPI.  Skin: See HPI.  Neurological: Negative for dizziness, tingling, tremors, sensory change, speech change, focal weakness, seizures, loss of consciousness, and headaches.   Endo: Negative for polydipsia.   Heme: On Xarelto.  Psychiatric/Behavioral: Negative for depression, suicidal ideas, hallucinations, memory loss and substance abuse. The patient is not nervous/anxious and does not  have insomnia.        Past Medical History:    Past Medical History:   Diagnosis Date   • Anaphylactic shock    • Pneumonia      Past Surgical History:    Past Surgical History:   Procedure Laterality Date   • ABDOMINAL HYSTERECTOMY TOTAL     • EYE SURGERY Bilateral    • GYN SURGERY     • OTHER ORTHOPEDIC SURGERY      bilat knee, C5-6 fusion, multiple ortho surgeries     Social History:    Social History     Social History   • Marital status:      Spouse name: N/A   • Number of children: N/A   • Years of education: N/A     Occupational History   • Not on file.     Social History Main Topics   • Smoking status: Passive Smoke Exposure - Never Smoker   • Smokeless tobacco: Never Used   • Alcohol use No   • Drug use: No   • Sexual activity: No     Other Topics Concern   • Not on file     Social History Narrative   • No narrative on file     Family History:    Family History   Problem Relation Age of Onset   • No Known Problems Mother    • Cancer Father 65     stomach   • No Known Problems Maternal Grandmother    • Heart Disease Maternal Grandfather    • No Known Problems Paternal Grandmother    • No Known Problems Paternal Grandfather      Medications:    Current Outpatient Prescriptions on File Prior to Visit   Medication Sig Dispense Refill   • ropinirole (REQUIP) 1 MG Tab Take 1 Tab by mouth every bedtime. 90 Tab 1   • rivaroxaban (XARELTO) 20 MG Tab tablet Take 1 Tab by mouth with dinner. 90 Tab 1   • furosemide (LASIX) 40 MG Tab Take 1 Tab by mouth every day. 90 Tab 1   • losartan (COZAAR) 50 MG Tab Take 50 mg by mouth 2 Times a Day.     • gabapentin (NEURONTIN) 600 MG tablet Take 600 mg by mouth 3 times a day.     • metoprolol SR (TOPROL XL) 50 MG TABLET SR 24 HR Take 25 mg by mouth 2 Times a Day.     • oxycodone immediate release (ROXICODONE) 10 MG immediate release tablet Take 10 mg by mouth every four hours as needed for Moderate Pain.     • digoxin (LANOXIN) 125 MCG Tab Take 125 mcg by mouth every day.   "     No current facility-administered medications on file prior to visit.      Allergies:    Allergies   Allergen Reactions   • Cefaclor Anaphylaxis   • Penicillins Anaphylaxis   • Sulfa Drugs Anaphylaxis   • Compazine    • Morphine        Vitals:    Vitals:    06/08/18 1535   BP: 132/78   Pulse: (!) 113   Resp: 18   Temp: 36.2 °C (97.2 °F)   SpO2: 97%   Weight: 75.8 kg (167 lb)   Height: 1.575 m (5' 2\")       Physical Exam:    Constitutional: Vital signs reviewed. Appears well-developed and well-nourished. No acute distress.   Eyes: Sclera white, conjunctivae clear.   ENT: External ears normal. Hearing normal.   Neck: Neck supple.   Cardiovascular: Tachycardic. Regular rhythm. No murmur.  Pulmonary/Chest: Respirations non-labored. Clear to auscultation bilaterally.  Lymph: Cervical nodes without tenderness or enlargement.  Musculoskeletal: Bruising right lower flank. Tender to palpation right lower flank, right mid back, and right lower back.  Neurological: Alert and oriented to person, place, and time. Muscle tone normal. Coordination normal.   Skin: Bruising right lower flank.  Psychiatric: Normal mood and affect. Behavior is normal. Judgment and thought content normal.       Assessment / Plan:    1. Lower respiratory infection  - levoFLOXacin (LEVAQUIN) 500 MG tablet; 1 TAB ONCE A DAY X 10 DAYS.  Dispense: 10 Tab; Refill: 0    2. Contusion of right side of back, initial encounter  - Diclofenac Sodium 1 % Gel; APPLY 4 GRAMS TO PAINFUL AREA ON BACK UP TO 4 TIMES A DAY ONLY IF NEEDED FOR PAIN TO HELP INFLAMMATION.  Dispense: 100 g; Refill: 2      Discussed with them DDX and management options.    She declines any imaging at this time.    Since she takes Xarelto and has Oxycodone and Gabapentin to take for pain, I have prescribed her topical anti-inflammatory to use as needed.    Agreeable to medications prescribed.    Follow-up with PCP or urgent care if getting worse or not better with time and above.  "

## 2018-06-14 ENCOUNTER — APPOINTMENT (OUTPATIENT)
Dept: RADIOLOGY | Facility: IMAGING CENTER | Age: 83
End: 2018-06-14
Attending: FAMILY MEDICINE
Payer: MEDICARE

## 2018-06-14 ENCOUNTER — OFFICE VISIT (OUTPATIENT)
Dept: URGENT CARE | Facility: PHYSICIAN GROUP | Age: 83
End: 2018-06-14
Payer: MEDICARE

## 2018-06-14 VITALS
HEIGHT: 62 IN | RESPIRATION RATE: 18 BRPM | OXYGEN SATURATION: 96 % | HEART RATE: 117 BPM | SYSTOLIC BLOOD PRESSURE: 130 MMHG | TEMPERATURE: 98.3 F | DIASTOLIC BLOOD PRESSURE: 80 MMHG | BODY MASS INDEX: 31.1 KG/M2 | WEIGHT: 169 LBS

## 2018-06-14 DIAGNOSIS — R05.9 COUGH: ICD-10-CM

## 2018-06-14 DIAGNOSIS — J20.9 ACUTE BRONCHITIS, UNSPECIFIED ORGANISM: ICD-10-CM

## 2018-06-14 DIAGNOSIS — J22 ACUTE LOWER RESPIRATORY INFECTION: ICD-10-CM

## 2018-06-14 PROCEDURE — 71046 X-RAY EXAM CHEST 2 VIEWS: CPT | Mod: TC,FY | Performed by: FAMILY MEDICINE

## 2018-06-14 PROCEDURE — 99214 OFFICE O/P EST MOD 30 MIN: CPT | Performed by: FAMILY MEDICINE

## 2018-06-14 RX ORDER — PREDNISONE 20 MG/1
TABLET ORAL
Qty: 5 TAB | Refills: 0 | Status: SHIPPED | OUTPATIENT
Start: 2018-06-14

## 2018-06-14 RX ORDER — AZITHROMYCIN 250 MG/1
TABLET, FILM COATED ORAL
Qty: 6 TAB | Refills: 0 | Status: SHIPPED | OUTPATIENT
Start: 2018-06-14

## 2018-06-14 NOTE — PROGRESS NOTES
Chief Complaint:    Chief Complaint   Patient presents with   • Fever   • Pneumonia     on medication but still not feeling better       History of Present Illness:     present. She was seen here on 6/8/18 and rx'd Levofloxacin 500 mg daily x 10 days. She is taking med but not feeling better. She has cough productive of purulent mucus and pain in left lower lung. Reports Juan has worked/tolerated in past and has tolerated Prednisone in past.      Review of Systems:    Constitutional: Negative for fever, chills, and diaphoresis.   Eyes: Negative for change in vision, photophobia, pain, redness, and discharge.  ENT: Negative for ear pain, ear discharge, hearing loss, tinnitus, nasal congestion, nosebleeds, and sore throat.    Respiratory: See HPI.  Cardiovascular: Negative for chest pain, palpitations, orthopnea, claudication, leg swelling, and PND.   Gastrointestinal: Negative for abdominal pain, nausea, vomiting, diarrhea, constipation, blood in stool, and melena.   Genitourinary: Negative for dysuria, urinary urgency, urinary frequency, hematuria, and flank pain.   Musculoskeletal: No new symptoms.   Skin: Negative for rash and itching.   Neurological: No new symptoms.   Endo: Negative for polydipsia.   Heme: On Xarelto.  Psychiatric/Behavioral: Negative for depression, suicidal ideas, hallucinations, memory loss and substance abuse. The patient is not nervous/anxious and does not have insomnia.        Past Medical History:    Past Medical History:   Diagnosis Date   • Anaphylactic shock    • Pneumonia      Past Surgical History:    Past Surgical History:   Procedure Laterality Date   • ABDOMINAL HYSTERECTOMY TOTAL     • EYE SURGERY Bilateral    • GYN SURGERY     • OTHER ORTHOPEDIC SURGERY      bilat knee, C5-6 fusion, multiple ortho surgeries     Social History:    Social History     Social History   • Marital status:      Spouse name: N/A   • Number of children: N/A   • Years of education: N/A      Social History Main Topics   • Smoking status: Passive Smoke Exposure - Never Smoker   • Smokeless tobacco: Never Used   • Alcohol use No   • Drug use: No   • Sexual activity: No     Other Topics Concern   • Not on file     Social History Narrative   • No narrative on file     Family History:    Family History   Problem Relation Age of Onset   • No Known Problems Mother    • Cancer Father 65     stomach   • No Known Problems Maternal Grandmother    • Heart Disease Maternal Grandfather    • No Known Problems Paternal Grandmother    • No Known Problems Paternal Grandfather      Medications:    Current Outpatient Prescriptions on File Prior to Visit   Medication Sig Dispense Refill   • levoFLOXacin (LEVAQUIN) 500 MG tablet 1 TAB ONCE A DAY X 10 DAYS. 10 Tab 0   • Diclofenac Sodium 1 % Gel APPLY 4 GRAMS TO PAINFUL AREA ON BACK UP TO 4 TIMES A DAY ONLY IF NEEDED FOR PAIN TO HELP INFLAMMATION. 100 g 2   • ropinirole (REQUIP) 1 MG Tab Take 1 Tab by mouth every bedtime. 90 Tab 1   • digoxin (LANOXIN) 125 MCG Tab Take 125 mcg by mouth every day.     • rivaroxaban (XARELTO) 20 MG Tab tablet Take 1 Tab by mouth with dinner. 90 Tab 1   • furosemide (LASIX) 40 MG Tab Take 1 Tab by mouth every day. 90 Tab 1   • losartan (COZAAR) 50 MG Tab Take 50 mg by mouth 2 Times a Day.     • gabapentin (NEURONTIN) 600 MG tablet Take 600 mg by mouth 3 times a day.     • metoprolol SR (TOPROL XL) 50 MG TABLET SR 24 HR Take 25 mg by mouth 2 Times a Day.     • oxycodone immediate release (ROXICODONE) 10 MG immediate release tablet Take 10 mg by mouth every four hours as needed for Moderate Pain.       No current facility-administered medications on file prior to visit.      Allergies:    Allergies   Allergen Reactions   • Cefaclor Anaphylaxis   • Penicillins Anaphylaxis   • Sulfa Drugs Anaphylaxis   • Compazine    • Morphine        Vitals:    Vitals:    06/14/18 1214   BP: 130/80   Pulse: (!) 117   Resp: 18   Temp: 36.8 °C (98.3 °F)   SpO2:  "96%   Weight: 76.7 kg (169 lb)   Height: 1.575 m (5' 2\")       Physical Exam:    Constitutional: Vital signs reviewed. Appears well-developed and well-nourished. No acute distress.   Eyes: Sclera white, conjunctivae clear.   ENT: External ears normal. Hearing normal.   Neck: Neck supple.   Cardiovascular: Tachycardic. Regular rhythm. No murmur.  Pulmonary/Chest: Respirations non-labored. Clear to auscultation bilaterally.  Lymph: Cervical nodes without tenderness or enlargement.  Neurological: Alert and oriented to person, place, and time. Muscle tone normal. Coordination normal.   Skin: No rashes or lesions. Warm, dry, normal turgor.  Psychiatric: Normal mood and affect. Behavior is normal. Judgment and thought content normal.     Diagnostics:    DX-CHEST-2 VIEWS (Order #[id number]) on 6/14/18   Narrative       6/14/2018 12:23 PM    HISTORY/REASON FOR EXAM:  Cough for 2 weeks.      TECHNIQUE/EXAM DESCRIPTION AND NUMBER OF VIEWS:  Two views of the chest.    COMPARISON:  12/29/2017    FINDINGS:  Cardiac silhouette is mildly enlarged.  Perihilar and lower lobe linear and interstitial opacifications are again noted which are slightly more prominent than on the prior radiograph. No consolidations are identified.  No pleural effusions are appreciated.     Impression       1.  Mild cardiomegaly.    2.  Mild increase in prominence of interstitial opacifications could be due to worsening of edema or inflammation. Atelectasis could also be present.    3.  No consolidations identified.     I personally reviewed the images. Rad report reviewed with them and copy to them.      Assessment / Plan:    1. Cough  - DX-CHEST-2 VIEWS; Future    2. Acute lower respiratory infection  - azithromycin (ZITHROMAX) 250 MG Tab; 2 TABS ON DAY 1, 1 TAB ON DAYS 2-5. STOP LEVOFLOXACIN ANTIBIOTIC.  Dispense: 6 Tab; Refill: 0    3. Acute bronchitis, unspecified organism  - predniSONE (DELTASONE) 20 MG Tab; 1 TAB ONCE A DAY X 5 DAYS. TAKE WITH FOOD.  " Dispense: 5 Tab; Refill: 0      Discussed with them DDX and management options.    D/c Levofloxacin rx'd 6/8/18.    Agreeable to medications prescribed.    Follow-up with PCP or urgent care if getting worse or not better with above.

## 2022-09-23 NOTE — TELEPHONE ENCOUNTER
Phone Number Called: [phone] (home)     Message: left message for patient to return call.     Left Message for patient to call back: yes         Left arm;